# Patient Record
Sex: MALE | Race: WHITE | Employment: FULL TIME | URBAN - NONMETROPOLITAN AREA
[De-identification: names, ages, dates, MRNs, and addresses within clinical notes are randomized per-mention and may not be internally consistent; named-entity substitution may affect disease eponyms.]

---

## 2017-10-30 ENCOUNTER — HOSPITAL ENCOUNTER (OUTPATIENT)
Dept: NON INVASIVE DIAGNOSTICS | Age: 42
Discharge: HOME | End: 2017-10-30
Payer: COMMERCIAL

## 2017-10-30 VITALS — WEIGHT: 170 LBS | BODY MASS INDEX: 27.32 KG/M2 | HEIGHT: 66 IN

## 2017-10-30 PROCEDURE — 93017 CV STRESS TEST TRACING ONLY: CPT | Performed by: NUCLEAR MEDICINE

## 2017-10-30 RX ORDER — LOSARTAN POTASSIUM 50 MG/1
50 TABLET ORAL NIGHTLY
COMMUNITY
End: 2021-05-17

## 2020-01-19 ENCOUNTER — HOSPITAL ENCOUNTER (EMERGENCY)
Age: 45
Discharge: HOME | End: 2020-01-19
Payer: COMMERCIAL

## 2020-01-19 VITALS
HEIGHT: 66 IN | BODY MASS INDEX: 27.32 KG/M2 | WEIGHT: 170 LBS | HEART RATE: 74 BPM | OXYGEN SATURATION: 96 % | SYSTOLIC BLOOD PRESSURE: 138 MMHG | TEMPERATURE: 97 F | DIASTOLIC BLOOD PRESSURE: 93 MMHG | RESPIRATION RATE: 16 BRPM

## 2020-01-19 PROCEDURE — 99203 OFFICE O/P NEW LOW 30 MIN: CPT | Performed by: NURSE PRACTITIONER

## 2020-01-19 PROCEDURE — 6360000002 HC RX W HCPCS: Performed by: NURSE PRACTITIONER

## 2020-01-19 PROCEDURE — 99212 OFFICE O/P EST SF 10 MIN: CPT

## 2020-01-19 PROCEDURE — 90471 IMMUNIZATION ADMIN: CPT | Performed by: NURSE PRACTITIONER

## 2020-01-19 PROCEDURE — 90715 TDAP VACCINE 7 YRS/> IM: CPT | Performed by: NURSE PRACTITIONER

## 2020-01-19 RX ORDER — POLYMYXIN B SULFATE AND TRIMETHOPRIM 1; 10000 MG/ML; [USP'U]/ML
1 SOLUTION OPHTHALMIC EVERY 4 HOURS
Qty: 1 BOTTLE | Refills: 0 | Status: SHIPPED | OUTPATIENT
Start: 2020-01-19 | End: 2020-01-29

## 2020-01-19 RX ORDER — PROPARACAINE HYDROCHLORIDE 5 MG/ML
2 SOLUTION/ DROPS OPHTHALMIC ONCE
Status: DISCONTINUED | OUTPATIENT
Start: 2020-01-19 | End: 2020-01-19 | Stop reason: HOSPADM

## 2020-01-19 RX ADMIN — TETANUS TOXOID, REDUCED DIPHTHERIA TOXOID AND ACELLULAR PERTUSSIS VACCINE, ADSORBED 0.5 ML: 5; 2.5; 8; 8; 2.5 SUSPENSION INTRAMUSCULAR at 17:00

## 2020-01-19 ASSESSMENT — VISUAL ACUITY
OS: 20/20
OD: 20/30
OU: 20/20
OU: 1

## 2020-01-19 ASSESSMENT — ENCOUNTER SYMPTOMS
SHORTNESS OF BREATH: 0
EYE REDNESS: 1
NAUSEA: 0
SORE THROAT: 0
VOMITING: 0
EYE PAIN: 1

## 2020-01-19 ASSESSMENT — PAIN SCALES - GENERAL: PAINLEVEL_OUTOF10: 3

## 2020-01-19 ASSESSMENT — PAIN DESCRIPTION - LOCATION: LOCATION: EYE

## 2020-01-19 NOTE — ED PROVIDER NOTES
[facility] 38  Urgent Care Encounter       CHIEF COMPLAINT       Chief Complaint   Patient presents with    Foreign Body in Eye     left. putting drop ceilings in and was cutting metal and thinks a piece got in eye       Nurses Notes reviewed and I agree except as noted in the HPI. HISTORY OF PRESENT ILLNESS   Kareem Taylor is a 45 y.o. male who presents evaluation of left eye irritation and the feeling of a foreign body. Patient states that roughly 2 hours before arrival at the urgent care he was cutting metal pieces while hanging ceiling and believes that a piece of metal got into his eye. States that has been very irritated. He denies any visual changes or significant pain/flashing lights behind the eye. States that his tetanus is not up-to-date. The history is provided by the patient. REVIEW OF SYSTEMS     Review of Systems   Constitutional: Negative for chills and fever. HENT: Negative for congestion and sore throat. Eyes: Positive for pain and redness. Negative for visual disturbance. Respiratory: Negative for shortness of breath. Cardiovascular: Negative for chest pain. Gastrointestinal: Negative for nausea and vomiting. Musculoskeletal: Negative for arthralgias and myalgias. Skin: Negative for rash. Allergic/Immunologic: Negative for immunocompromised state. Neurological: Negative for headaches. PAST MEDICAL HISTORY         Diagnosis Date    GERD (gastroesophageal reflux disease)     hiatal hernia       SURGICALHISTORY     Patient  has a past surgical history that includes back surgery.     CURRENT MEDICATIONS       Previous Medications    ACETAMINOPHEN (TYLENOL) 500 MG TABLET    Take 500 mg by mouth every 6 hours as needed for Pain    ESOMEPRAZOLE (NEXIUM) 40 MG CAPSULE      Take 40 mg by mouth 2 times daily     IBUPROFEN (ADVIL;MOTRIN) 600 MG TABLET    Take 1 tablet by mouth 3 times daily as needed for Pain or Fever (Take with food 3 times daily for pain or fever)    LOSARTAN (COZAAR) 50 MG TABLET    Take 50 mg by mouth nightly       ALLERGIES     Patient is has No Known Allergies. Patients   Immunization History   Administered Date(s) Administered    Tdap (Boostrix, Adacel) 01/19/2020       FAMILY HISTORY     Patient's family history includes Diabetes in his father; Heart Disease in his father; Heart Surgery in his father; High Blood Pressure in his father. SOCIAL HISTORY     Patient  reports that he has never smoked. He has never used smokeless tobacco. He reports that he does not drink alcohol or use drugs. PHYSICAL EXAM     ED TRIAGE VITALS  BP: (!) 138/93, Temp: 97 °F (36.1 °C), Pulse: 74, Resp: 16, SpO2: 96 %,Estimated body mass index is 27.44 kg/m² as calculated from the following:    Height as of this encounter: 5' 6\" (1.676 m). Weight as of this encounter: 170 lb (77.1 kg). ,No LMP for male patient. Physical Exam  Constitutional:       General: He is not in acute distress. Appearance: He is well-developed. He is not diaphoretic. Eyes:      General: Lids are normal. Vision grossly intact. Left eye: No foreign body. Conjunctiva/sclera:      Right eye: Right conjunctiva is not injected. Left eye: Left conjunctiva is injected. Pupils: Pupils are equal, round, and reactive to light. Pupils are equal.      Left eye: Corneal abrasion present. Comments: Small abrasion noted to the left cornea   Neck:      Musculoskeletal: Normal range of motion. Cardiovascular:      Rate and Rhythm: Normal rate and regular rhythm. Heart sounds: No murmur. Pulmonary:      Effort: Pulmonary effort is normal. No respiratory distress. Breath sounds: Normal breath sounds. Musculoskeletal:      Right knee: He exhibits normal range of motion. Left knee: He exhibits normal range of motion. Skin:     General: Skin is warm. Findings: No rash.    Neurological:      Mental Status: He is alert

## 2020-01-19 NOTE — ED NOTES
To [facility] with complaints of foreign body in left eye. States that is started a few hours ago when he was putting in a drop ceiling.  States that he was cutting metal and thinks a piece may of gotten into his left eye     Júnior Villalta RN  01/19/20 1640

## 2020-05-24 LAB
BASOPHILS ABSOLUTE: 0.1 /ΜL
BASOPHILS RELATIVE PERCENT: 1.4 %
EOSINOPHILS ABSOLUTE: 0.4 /ΜL
EOSINOPHILS RELATIVE PERCENT: 5.3 %
FERRITIN: 66 NG/ML (ref 18–300)
GLUCOSE BLD-MCNC: 103 MG/DL
HCT VFR BLD CALC: 42.4 % (ref 41–53)
HEMOGLOBIN: 14.2 G/DL (ref 13.5–17.5)
LYMPHOCYTES ABSOLUTE: 1.7 /ΜL
LYMPHOCYTES RELATIVE PERCENT: 22.5 %
MCH RBC QN AUTO: 27.4 PG
MCHC RBC AUTO-ENTMCNC: 33.6 G/DL
MCV RBC AUTO: 82 FL
MONOCYTES ABSOLUTE: 0.4 /ΜL
MONOCYTES RELATIVE PERCENT: 5.4 %
NEUTROPHILS ABSOLUTE: 4.9 /ΜL
NEUTROPHILS RELATIVE PERCENT: 65.4 %
PDW BLD-RTO: 14.1 %
PLATELET # BLD: 270 K/ΜL
PMV BLD AUTO: 8.3 FL
PROSTATE SPECIFIC ANTIGEN: 0.44 NG/ML
RBC # BLD: 5.2 10^6/ΜL
WBC # BLD: 7.5 10^3/ML

## 2021-02-02 ENCOUNTER — OFFICE VISIT (OUTPATIENT)
Dept: UROLOGY | Age: 46
End: 2021-02-02
Payer: COMMERCIAL

## 2021-02-02 VITALS — WEIGHT: 175 LBS | HEIGHT: 66 IN | TEMPERATURE: 96.6 F | BODY MASS INDEX: 28.12 KG/M2

## 2021-02-02 DIAGNOSIS — N40.1 LOWER URINARY TRACT SYMPTOMS DUE TO BENIGN PROSTATIC HYPERPLASIA: Primary | ICD-10-CM

## 2021-02-02 LAB
BILIRUBIN URINE: NEGATIVE
BLOOD URINE, POC: NEGATIVE
CHARACTER, URINE: CLEAR
COLOR, URINE: YELLOW
GLUCOSE URINE: NEGATIVE MG/DL
KETONES, URINE: NEGATIVE
LEUKOCYTE CLUMPS, URINE: NEGATIVE
NITRITE, URINE: NEGATIVE
PH, URINE: 6.5 (ref 5–9)
POST VOID RESIDUAL (PVR): 17 ML
PROTEIN, URINE: NEGATIVE MG/DL
SPECIFIC GRAVITY, URINE: 1.02 (ref 1–1.03)
UROBILINOGEN, URINE: 0.2 EU/DL (ref 0–1)

## 2021-02-02 PROCEDURE — 99203 OFFICE O/P NEW LOW 30 MIN: CPT | Performed by: UROLOGY

## 2021-02-02 PROCEDURE — 81003 URINALYSIS AUTO W/O SCOPE: CPT | Performed by: UROLOGY

## 2021-02-02 PROCEDURE — 51798 US URINE CAPACITY MEASURE: CPT | Performed by: UROLOGY

## 2021-02-02 RX ORDER — ALFUZOSIN HYDROCHLORIDE 10 MG/1
10 TABLET, EXTENDED RELEASE ORAL NIGHTLY
Qty: 30 TABLET | Refills: 5 | Status: SHIPPED | OUTPATIENT
Start: 2021-02-02 | End: 2021-05-21

## 2021-02-02 ASSESSMENT — ENCOUNTER SYMPTOMS
EYE PAIN: 0
SHORTNESS OF BREATH: 0
BACK PAIN: 0
COLOR CHANGE: 0
CHEST TIGHTNESS: 0
NAUSEA: 0
EYE ITCHING: 0
DIARRHEA: 0

## 2021-02-02 NOTE — PROGRESS NOTES
Attends [employer] service: Not on file     Active member of club or organization: Not on file     Attends meetings of clubs or organizations: Not on file     Relationship status: Not on file    Intimate partner violence     Fear of current or ex partner: Not on file     Emotionally abused: Not on file     Physically abused: Not on file     Forced sexual activity: Not on file   Other Topics Concern    Not on file   Social History Narrative    Not on file       Family History   Problem Relation Age of Onset    Heart Disease Father     Heart Surgery Father     Diabetes Father     High Blood Pressure Father        Past Surgical History:   Procedure Laterality Date    BACK SURGERY         No Known Allergies      Current Outpatient Medications:     alfuzosin (UROXATRAL) 10 MG extended release tablet, Take 1 tablet by mouth nightly, Disp: 30 tablet, Rfl: 5    acetaminophen (TYLENOL) 500 MG tablet, Take 500 mg by mouth every 6 hours as needed for Pain, Disp: , Rfl:     esomeprazole (NEXIUM) 40 MG capsule,  Take 40 mg by mouth 2 times daily , Disp: , Rfl:     losartan (COZAAR) 50 MG tablet, Take 50 mg by mouth nightly, Disp: , Rfl:     Review of Systems   Constitutional: Negative for chills and fever. Eyes: Negative for pain and itching. Respiratory: Negative for chest tightness and shortness of breath. Cardiovascular: Negative for chest pain and leg swelling. Gastrointestinal: Negative for diarrhea and nausea. Endocrine: Negative for cold intolerance and heat intolerance. Genitourinary: Positive for difficulty urinating (weak stream), frequency and urgency. Negative for hematuria. Musculoskeletal: Negative for back pain and joint swelling. Skin: Negative for color change and rash. Allergic/Immunologic: Negative for environmental allergies and food allergies. Neurological: Negative for dizziness and headaches. Hematological: Does not bruise/bleed easily. Temp 96.6 °F (35.9 °C)   Ht 5' 6\" (1.676 m)   Wt 175 lb (79.4 kg)   BMI 28.25 kg/m²     Objective:   Physical Exam  Vitals signs reviewed. Constitutional:       Appearance: He is well-developed and normal weight. HENT:      Head: Normocephalic and atraumatic. Right Ear: External ear normal.      Left Ear: External ear normal.   Eyes:      Extraocular Movements: Extraocular movements intact. Conjunctiva/sclera: Conjunctivae normal.      Pupils: Pupils are equal, round, and reactive to light. Abdominal:      General: Bowel sounds are normal. There is no distension. Palpations: Abdomen is soft. There is no mass. Tenderness: There is no abdominal tenderness. There is no right CVA tenderness, left CVA tenderness or guarding. Hernia: No hernia is present. Genitourinary:     Penis: Normal.       Testes: Normal.      Prostate: Normal.      Rectum: Normal.      Comments: No rectal mass; the prostate is smooth, symmetric, no palpable nodules  Skin:     General: Skin is warm and dry. Neurological:      Mental Status: He is alert and oriented to person, place, and time. Psychiatric:         Mood and Affect: Mood normal.         Behavior: Behavior normal.         Assessment:       Diagnosis Orders   1. Lower urinary tract symptoms due to benign prostatic hyperplasia  POCT Urinalysis No Micro (Auto)    poct post void residual       Mr. Zapatapresents today in consultation for Lower urinary tract symptoms due to benign prostatic hyperplasia [N40.1]. The patient has early obstructive voiding symptoms. I told him 40% of men will have this at his age. I also told him that 20% of alpha blockers are ineffective had relieving voiding symptoms. I also told him that a 5 alpha reductase inhibitor is unlikely to work in someone his age and even if it did it would take 6 to 12 months to be effective. There is a family history of prostate cancer.   His exam and PSA completely normal. Plan:        1. Discontinue finasteride. 2.  I am starting him on Uroxatrol 10 mg p.o. nightly. I discussed retrograde ejaculation in detail as well as lightheadedness and other common side effects.   3.  Return in 4 weeks

## 2021-02-27 PROBLEM — L72.3 SEBACEOUS CYST OF LEFT AXILLA: Status: ACTIVE | Noted: 2021-02-27

## 2021-02-27 NOTE — PROGRESS NOTES
Shawnee Lee MD [facility]  General Surgery  New Patient Evaluation in Office  Pt Name: Adam Boone  Date of Birth 1975   Today's Date: 3/1/2021  Medical Record Number: [id number]  Referring Provider: Naun Saha MD  Primary Care Provider: Jasper Knight MD  Chief Complaint   Patient presents with   [employer] Surgical Consult     new patient--ref by Dr. Jose Keenan for right cyst left axilla     ASSESSMENT       Diagnosis Orders   1. Infected sebaceous cyst of skin     2. Abscess of right axilla  46441 - [place] DRAIN SKIN ABSCESS SIMPLE     Past Medical History:   Diagnosis Date    GERD (gastroesophageal reflux disease)     hiatal hernia    High blood pressure     Urinary retention     Dr. Sohan Lowe      1. Schedule Danie for excision of I and D right axillary abscess  2. The risks complications and benefits of the procedure were discussed with the patient including, but not limited to, infection, bleeding, recurrence. The anticipated consequences of not treating this problem were also discussed. The patient was given an opportunity to ask questions. Once answered, the patient is agreeable to proceed with the procedure. 2. Status: office  3. Planned anesthesia: local  4. Donna Finch is a 39 y.o. male seen in the consultation for evaluation of a subcutaneous mass. The mass in his RIGHT axilla posterior fold. He saw Dr. Jose Keenan in the office on February 19 describing the right arm axilla area painful cyst.  According to his documentation he complaining of an occasional tender mass in the axilla for a couple of months fever chills no drainage exam describes a nonfluctuant fluid-filled mass in the axilla with kind of dusky erythema over the surface. He is referred for evaluation of this. He was started on Duricef 500 mg 1 p.o. twice daily x7 days with 1 refill. He says it hasnt made any difference in he redness or pain. He has had no constitutional sxs. Never had this before.    Minnie notes reviewed  Past Medical History  Past Medical History:   Diagnosis Date    GERD (gastroesophageal reflux disease)     hiatal hernia    High blood pressure     Urinary retention     Dr. Jad Huff     Past Surgical History  Past Surgical History:   Procedure Laterality Date    BACK SURGERY      COLONOSCOPY  02/2021    Dr. Chris Shukla  2017    Dr. Nicole Shukla-- Hiatal hernia     Medications  Current Outpatient Medications   Medication Sig Dispense Refill    alfuzosin (UROXATRAL) 10 MG extended release tablet Take 1 tablet by mouth nightly 30 tablet 5    losartan (COZAAR) 50 MG tablet Take 50 mg by mouth nightly      esomeprazole (NEXIUM) 40 MG capsule   Take 40 mg by mouth 2 times daily       acetaminophen (TYLENOL) 500 MG tablet Take 500 mg by mouth every 6 hours as needed for Pain       No current facility-administered medications for this visit. Allergies  has No Known Allergies. Family History  family history includes Diabetes in his father; Heart Disease in his father; Heart Surgery in his father; High Blood Pressure in his father. Social History   reports that he has never smoked. He has never used smokeless tobacco. He reports that he does not drink alcohol or use drugs. Health Screening Exams  Health Maintenance   Topic Date Due    Hepatitis C screen  1975    HIV screen  05/26/1992    Potassium monitoring  03/26/2017    Creatinine monitoring  03/26/2017    Diabetes screen  05/26/2017    Flu vaccine (1) 09/01/2020    Lipid screen  03/26/2021    DTaP/Tdap/Td vaccine (2 - Td) 01/19/2030    Hepatitis A vaccine  Aged Out    Hepatitis B vaccine  Aged Out    Hib vaccine  Aged Out    Meningococcal (ACWY) vaccine  Aged Out    Pneumococcal 0-64 years Vaccine  Aged Out     Review of Systems  Constitutional: Negative for activity change, appetite change, chills, diaphoresis, fatigue, fever and unexpected weight change.    HENT: Negative for congestion, pressure is 125/78 and his pulse is 71. His respiration is 15 and oxygen saturation is 98%. CONSTITUTIONAL: Alert and oriented times 3, no acute distress and cooperative to examination with proper mood and affect. SKIN: Skin color, texture, turgor normal. In the right axilla, posterior fold is a 4 cm raised, red, swollen, fluctuant tender mass c/w abscess. It is skin associated. LYMPH: No axillary  lymphadenopathy. Thank you for the interesting evaluation. Further recommendations as listed above. Electronically signed by Edson Arango MD on 3/1/2021 at 7:04 PM   [facility]  Operative Report    PATIENT NAME: Mikki Gaytan  RECORD NO. [id number]  SURGEON: Edson Arango MD, FACS  Primary Care Physician: Diana Sutton MD  Date: 3/1/2021, 7:04 PM     PROCEDURE PERFORMED: Incision and Drainage  PREOPERATIVE DIAGNOSIS:  axillary abscess   POSTOPERATIVE DIAGNOSIS: Same  SURGEON:  Edson Arango MD, FACS  ANESTHESIA:  Local anesthesia 1% buffered lidocaine, 0.5% bupivacaine and local  ANESTHESIA: 0.5 % Marcaine in equal parts  5 mls used                           ESTIMATED BLOOD LOSS:  0  ml  SPECIMEN:  none  COMPLICATIONS:  None; patient tolerated the procedure well. DISPOSITION: home  CONDITION: stable      Procedure: The patient was positioned appropriately and the skin over the incision site was prepped with Chloraprep. Local anesthesia was obtained by infiltration using 1% Lidocaine without epinephrine. An incision was then made over the greatest area of fluctuance and approximately 5 cc of serous material was expressed. Loculations were not present. The drainage cavity was then dressed with a sterile dressing. The patient tolerated the procedure well.     Complications: None    Edson Arango MD FACS  Electronically signed 3/1/2021 at 7:04 PM

## 2021-03-01 ENCOUNTER — OFFICE VISIT (OUTPATIENT)
Dept: SURGERY | Age: 46
End: 2021-03-01
Payer: COMMERCIAL

## 2021-03-01 VITALS
SYSTOLIC BLOOD PRESSURE: 125 MMHG | BODY MASS INDEX: 27.97 KG/M2 | HEIGHT: 66 IN | HEART RATE: 71 BPM | TEMPERATURE: 97.1 F | OXYGEN SATURATION: 98 % | DIASTOLIC BLOOD PRESSURE: 78 MMHG | WEIGHT: 174 LBS | RESPIRATION RATE: 15 BRPM

## 2021-03-01 DIAGNOSIS — L08.9 INFECTED SEBACEOUS CYST OF SKIN: ICD-10-CM

## 2021-03-01 DIAGNOSIS — L72.3 INFECTED SEBACEOUS CYST OF SKIN: ICD-10-CM

## 2021-03-01 DIAGNOSIS — L02.411 ABSCESS OF RIGHT AXILLA: ICD-10-CM

## 2021-03-01 PROCEDURE — 99202 OFFICE O/P NEW SF 15 MIN: CPT | Performed by: SURGERY

## 2021-03-01 PROCEDURE — 10060 I&D ABSCESS SIMPLE/SINGLE: CPT | Performed by: SURGERY

## 2021-03-01 ASSESSMENT — ENCOUNTER SYMPTOMS
APNEA: 0
BLOOD IN STOOL: 0
BACK PAIN: 0
CHOKING: 0
SHORTNESS OF BREATH: 0
EYE ITCHING: 0
COLOR CHANGE: 0
EYE REDNESS: 0
VOMITING: 0
SINUS PAIN: 0
VOICE CHANGE: 0
FACIAL SWELLING: 0
WHEEZING: 0
ABDOMINAL PAIN: 0
STRIDOR: 0
CONSTIPATION: 0
SINUS PRESSURE: 0
EYE PAIN: 0
TROUBLE SWALLOWING: 0
RHINORRHEA: 0
EYE DISCHARGE: 0
PHOTOPHOBIA: 0
ANAL BLEEDING: 0
COUGH: 0
NAUSEA: 0
ABDOMINAL DISTENTION: 0
DIARRHEA: 0
SORE THROAT: 0
CHEST TIGHTNESS: 0
RECTAL PAIN: 0

## 2021-03-01 NOTE — LETTER
[facility]  Linda Osullivan MD FACS  Phone- [phone]  Fax [phone]    Pt Name: Ayana Evans Record Number: [id number]  Date of Birth 1975   Today's Date: 3/1/2021    Junie Montes was evaluated in the office today. My assessment and plans are listed below. Assessment:     Danie was seen today for surgical consult. Diagnoses and all orders for this visit:    Infected sebaceous cyst of skin    Abscess of right axilla  -     97871 - [place] DRAIN SKIN ABSCESS SIMPLE         Plan:  1. Schedule Danie for excision of I and D right axillary abscess  2. The risks complications and benefits of the procedure were discussed with the patient including, but not limited to, infection, bleeding, recurrence. The anticipated consequences of not treating this problem were also discussed. The patient was given an opportunity to ask questions. Once answered, the patient is agreeable to proceed with the procedure. 2. Status: office  3. Planned anesthesia: local  4. If I can provide any additional assistance or you have any concerns, please feel free to contact me. Thank you for allowing to participate in the care of your patients. Sincerely,      Linda Osullivan MD FACS  [address]  [place] [address]  Office: [phone]  Fax: [phone]

## 2021-03-01 NOTE — PROGRESS NOTES
Subjective:      Patient ID: Robyn Light is a 39 y.o. male. Chief Complaint   Patient presents with    Surgical Consult     new patient--ref by Dr. Gudelia Madrigal for right cyst left axilla       HPI  /78 (Site: Right Upper Arm, Position: Sitting, Cuff Size: Medium Adult)   Pulse 71   Temp 97.1 °F (36.2 °C) (Tympanic)   Resp 15   Ht 5' 6\" (1.676 m)   Wt 174 lb (78.9 kg)   SpO2 98%   BMI 28.08 kg/m²     Review of Systems   Constitutional: Negative for activity change, appetite change, chills, diaphoresis, fatigue, fever and unexpected weight change. HENT: Negative for congestion, dental problem, drooling, ear discharge, ear pain, facial swelling, hearing loss, mouth sores, nosebleeds, postnasal drip, rhinorrhea, sinus pressure, sinus pain, sneezing, sore throat, tinnitus, trouble swallowing and voice change. Eyes: Negative for photophobia, pain, discharge, redness, itching and visual disturbance. Respiratory: Negative for apnea, cough, choking, chest tightness, shortness of breath, wheezing and stridor. Cardiovascular: Negative for chest pain, palpitations and leg swelling. Gastrointestinal: Negative for abdominal distention, abdominal pain, anal bleeding, blood in stool, constipation, diarrhea, nausea, rectal pain and vomiting. Endocrine: Negative for cold intolerance, heat intolerance, polydipsia, polyphagia and polyuria. Genitourinary: Negative for decreased urine volume, difficulty urinating, discharge, dysuria, enuresis, flank pain, frequency, genital sores, hematuria, penile pain, penile swelling, scrotal swelling, testicular pain and urgency. Musculoskeletal: Negative for arthralgias, back pain, gait problem, joint swelling, myalgias, neck pain and neck stiffness. Skin: Positive for wound (right axilla cyst). Negative for color change, pallor and rash. Allergic/Immunologic: Negative for environmental allergies, food allergies and immunocompromised state. Neurological: Negative for dizziness, tremors, seizures, syncope, facial asymmetry, speech difficulty, weakness, light-headedness, numbness and headaches. Hematological: Negative for adenopathy. Does not bruise/bleed easily. Psychiatric/Behavioral: Negative for agitation, behavioral problems, confusion, decreased concentration, dysphoric mood, hallucinations, self-injury, sleep disturbance and suicidal ideas. The patient is not nervous/anxious and is not hyperactive.         Objective:   Physical Exam    Assessment:            Plan:              Braulio Andrade

## 2021-03-02 ENCOUNTER — OFFICE VISIT (OUTPATIENT)
Dept: UROLOGY | Age: 46
End: 2021-03-02
Payer: COMMERCIAL

## 2021-03-02 VITALS — TEMPERATURE: 97.3 F | HEIGHT: 66 IN | WEIGHT: 174 LBS | BODY MASS INDEX: 27.97 KG/M2

## 2021-03-02 DIAGNOSIS — N40.1 LOWER URINARY TRACT SYMPTOMS DUE TO BENIGN PROSTATIC HYPERPLASIA: Primary | ICD-10-CM

## 2021-03-02 LAB
BILIRUBIN URINE: NEGATIVE
BLOOD URINE, POC: NEGATIVE
CHARACTER, URINE: CLEAR
COLOR, URINE: YELLOW
GLUCOSE URINE: NEGATIVE MG/DL
KETONES, URINE: NEGATIVE
LEUKOCYTE CLUMPS, URINE: NEGATIVE
NITRITE, URINE: NEGATIVE
PH, URINE: 6 (ref 5–9)
PROTEIN, URINE: NEGATIVE MG/DL
SPECIFIC GRAVITY, URINE: 1.01 (ref 1–1.03)
UROBILINOGEN, URINE: 0.2 EU/DL (ref 0–1)

## 2021-03-02 PROCEDURE — 81003 URINALYSIS AUTO W/O SCOPE: CPT | Performed by: UROLOGY

## 2021-03-02 PROCEDURE — 99212 OFFICE O/P EST SF 10 MIN: CPT | Performed by: UROLOGY

## 2021-04-05 ENCOUNTER — OFFICE VISIT (OUTPATIENT)
Dept: SURGERY | Age: 46
End: 2021-04-05
Payer: COMMERCIAL

## 2021-04-05 VITALS
SYSTOLIC BLOOD PRESSURE: 130 MMHG | RESPIRATION RATE: 18 BRPM | DIASTOLIC BLOOD PRESSURE: 62 MMHG | BODY MASS INDEX: 27.97 KG/M2 | OXYGEN SATURATION: 98 % | HEIGHT: 66 IN | WEIGHT: 174 LBS | HEART RATE: 91 BPM | TEMPERATURE: 96.8 F

## 2021-04-05 DIAGNOSIS — L02.411 ABSCESS OF RIGHT AXILLA: Primary | ICD-10-CM

## 2021-04-05 PROCEDURE — G8419 CALC BMI OUT NRM PARAM NOF/U: HCPCS | Performed by: SURGERY

## 2021-04-05 PROCEDURE — 99211 OFF/OP EST MAY X REQ PHY/QHP: CPT | Performed by: SURGERY

## 2021-04-05 PROCEDURE — G8427 DOCREV CUR MEDS BY ELIG CLIN: HCPCS | Performed by: SURGERY

## 2021-04-05 ASSESSMENT — ENCOUNTER SYMPTOMS
FACIAL SWELLING: 0
COLOR CHANGE: 0
EYE DISCHARGE: 0
EYE REDNESS: 0
RECTAL PAIN: 0
SORE THROAT: 0
SINUS PRESSURE: 0
WHEEZING: 0
COUGH: 0
APNEA: 0
EYE ITCHING: 0
SHORTNESS OF BREATH: 0
ABDOMINAL DISTENTION: 0
RHINORRHEA: 0
EYE PAIN: 0
STRIDOR: 0
NAUSEA: 0
TROUBLE SWALLOWING: 0
CHOKING: 0
VOICE CHANGE: 0
ABDOMINAL PAIN: 0
ANAL BLEEDING: 0
DIARRHEA: 0
PHOTOPHOBIA: 0
BACK PAIN: 0
CHEST TIGHTNESS: 0
BLOOD IN STOOL: 0
VOMITING: 0
CONSTIPATION: 0

## 2021-04-05 NOTE — PROGRESS NOTES
Je Martínez MD [facility]  General Surgery  New Patient Evaluation in Office  Pt Name: Sanju Marsh  Date of Birth 1975   Today's Date: 4/5/2021  Medical Record Number: [id number]  Referring Provider: No ref. provider found  Primary Care Provider: Ernesto Velasco MD  Chief Complaint   Patient presents with    Follow Up After Procedure     s/p I and D right axillary abscess in office 3/1/21     ASSESSMENT       Diagnosis Orders   1. Abscess of right axilla       Past Medical History:   Diagnosis Date    GERD (gastroesophageal reflux disease)     hiatal hernia    High blood pressure     Urinary retention     Dr. Paulina Read      1. Schedule Danie for nothing at this time as clinically he is completely resolved and there appears to be no residual cyst in this area  2. He was instructed that if he developed any further problems in his area did disco ahead and call the office we be glad to see him without a referral     Juan Manuel Pires is a 39 y.o. male seen initially in consultation for evaluation of a subcutaneous mass. The mass in his RIGHT axilla posterior fold. He saw Dr. Eddy Woodard in the office on February 19 describing the right arm axilla area painful cyst.  According to his documentation he complaining of an occasional tender mass in the axilla for a couple of months fever chills no drainage exam describes a nonfluctuant fluid-filled mass in the axilla with kind of dusky erythema over the surface. He is referred for evaluation of this. He was started on Duricef 500 mg 1 p.o. twice daily x7 days with 1 refill. He says it hasnt made any difference in he redness or pain. He has had no constitutional sxs. Never had this before. Dr Ronit Trimble notes reviewed  In the office at his last visit we did an I&D which revealed a cystic cavity and this was left open to heal by secondary intention. He is now back in the office for reevaluation.   He states is completely closed up and he cannot feel anything there and has had no drainage at least for a couple of weeks. Past Medical History  Past Medical History:   Diagnosis Date    GERD (gastroesophageal reflux disease)     hiatal hernia    High blood pressure     Urinary retention     Dr. Claude Sellers     Past Surgical History  Past Surgical History:   Procedure Laterality Date    BACK SURGERY      COLONOSCOPY  02/2021    Dr. Cherise Ganser HISTORY  03/01/2021    s/p I and D right axillary abscess in office Dr He Smalls  2017    Dr. Rosemary Cameron-- Hiatal hernia     Medications  Current Outpatient Medications   Medication Sig Dispense Refill    alfuzosin (UROXATRAL) 10 MG extended release tablet Take 1 tablet by mouth nightly 30 tablet 5    losartan (COZAAR) 50 MG tablet Take 50 mg by mouth nightly      acetaminophen (TYLENOL) 500 MG tablet Take 500 mg by mouth every 6 hours as needed for Pain      esomeprazole (NEXIUM) 40 MG capsule   Take 40 mg by mouth 2 times daily        No current facility-administered medications for this visit. Allergies  has No Known Allergies. Family History  family history includes Diabetes in his father; Heart Disease in his father; Heart Surgery in his father; High Blood Pressure in his father. Social History   reports that he has never smoked. He has never used smokeless tobacco. He reports that he does not drink alcohol or use drugs.   Health Screening Exams  Health Maintenance   Topic Date Due    Hepatitis C screen  Never done    HIV screen  Never done    COVID-19 Vaccine (1) Never done    Potassium monitoring  03/26/2017    Creatinine monitoring  03/26/2017    Diabetes screen  Never done    Lipid screen  03/26/2021    Flu vaccine (Season Ended) 09/01/2021    DTaP/Tdap/Td vaccine (2 - Td) 01/19/2030    Hepatitis A vaccine  Aged Out    Hepatitis B vaccine  Aged Out    Hib vaccine  Aged Out    Meningococcal (ACWY) vaccine  Aged Out    Pneumococcal 0-64 years Vaccine  Aged Out     Review of Systems  Constitutional: Negative for activity change, appetite change, chills, diaphoresis, fatigue, fever and unexpected weight change. HENT: Negative for congestion, dental problem, drooling, ear discharge, ear pain, facial swelling, hearing loss, mouth sores, nosebleeds, postnasal drip, rhinorrhea, sinus pressure, sinus pain, sneezing, sore throat, tinnitus, trouble swallowing and voice change. Eyes: Negative for photophobia, pain, discharge, redness, itching and visual disturbance. Respiratory: Negative for apnea, cough, choking, chest tightness, shortness of breath, wheezing and stridor. Cardiovascular: Negative for chest pain, palpitations and leg swelling. Gastrointestinal: Negative for abdominal distention, abdominal pain, anal bleeding, blood in stool, constipation, diarrhea, nausea, rectal pain and vomiting. Endocrine: Negative for cold intolerance, heat intolerance, polydipsia, polyphagia and polyuria. Genitourinary: Negative for decreased urine volume, difficulty urinating, discharge, dysuria, enuresis, flank pain, frequency, genital sores, hematuria, penile pain, penile swelling, scrotal swelling, testicular pain and urgency. Musculoskeletal: Negative for arthralgias, back pain, gait problem, joint swelling, myalgias, neck pain and neck stiffness. Skin: Positive for wound (right axilla cyst). Negative for color change, pallor and rash. Allergic/Immunologic: Negative for environmental allergies, food allergies and immunocompromised state. Neurological: Negative for dizziness, tremors, seizures, syncope, facial asymmetry, speech difficulty, weakness, light-headedness, numbness and headaches. Hematological: Negative for adenopathy. Does not bruise/bleed easily.    Psychiatric/Behavioral: Negative for agitation, behavioral problems, confusion, decreased concentration, dysphoric mood, hallucinations, self-injury, sleep disturbance and suicidal ideas. The patient is not nervous/anxious and is not hyperactive      OBJECTIVE    VITALS:  height is 5' 6\" (1.676 m) and weight is 174 lb (78.9 kg). His temporal temperature is 96.8 °F (36 °C). His blood pressure is 130/62 and his pulse is 91. His respiration is 18 and oxygen saturation is 98%. CONSTITUTIONAL: Alert and oriented times 3, no acute distress and cooperative to examination with proper mood and affect. SKIN: Skin color, texture, turgor normal. In the right axilla, posterior fold i there is a scar from the I&D is completely closed somewhat violaceous red but there is no swelling induration edema or palpable nodule of any kind    LYMPH: No axillary  lymphadenopathy. Thank you for the interesting evaluation. Further recommendations as listed above.        Electronically signed by Valery Rizzo MD on 4/6/2021 at 7:32 AM

## 2021-04-05 NOTE — LETTER
[facility]  Suzi Pineda MD FACS  Phone- [phone]  Fax [phone]    Pt Name: Ayana Evans Record Number: [id number]  Date of Birth 1975   Today's Date: 4/6/2021    Hossein Mckeon was evaluated in the office today. My assessment and plans are listed below. Assessment:     Danie was seen today for follow up after procedure. Diagnoses and all orders for this visit:    Abscess of right axilla         Plan:  1. Schedule Danie for nothing at this time as clinically he is completely resolved and there appears to be no residual cyst in this area  2. He was instructed that if he developed any further problems in his area did disco ahead and call the office we be glad to see him without a referral             If I can provide any additional assistance or you have any concerns, please feel free to contact me. Thank you for allowing to participate in the care of your patients. Sincerely,      Suzi Pineda MD FACS  [address]  [place], [address]  Office: [phone]  Fax: [phone]

## 2021-04-05 NOTE — PROGRESS NOTES
Subjective:      Patient ID: Ray Pascual is a 39 y.o. male. Chief Complaint   Patient presents with    Follow Up After Procedure     s/p I and D right axillary abscess in office 3/1/21       HPI    Review of Systems   Constitutional: Negative for activity change, appetite change, chills, diaphoresis, fatigue, fever and unexpected weight change. HENT: Negative for congestion, dental problem, drooling, ear discharge, ear pain, facial swelling, hearing loss, mouth sores, nosebleeds, postnasal drip, rhinorrhea, sinus pressure, sneezing, sore throat, tinnitus, trouble swallowing and voice change. Eyes: Negative for photophobia, pain, discharge, redness, itching and visual disturbance. Respiratory: Negative for apnea, cough, choking, chest tightness, shortness of breath, wheezing and stridor. Cardiovascular: Negative for chest pain, palpitations and leg swelling. Gastrointestinal: Negative for abdominal distention, abdominal pain, anal bleeding, blood in stool, constipation, diarrhea, nausea, rectal pain and vomiting. Endocrine: Negative for cold intolerance, heat intolerance, polydipsia, polyphagia and polyuria. Genitourinary: Negative for decreased urine volume, difficulty urinating, dysuria, enuresis, flank pain, frequency, genital sores, hematuria and urgency. Musculoskeletal: Negative for arthralgias, back pain, gait problem, joint swelling, myalgias, neck pain and neck stiffness. Skin: Negative for color change, pallor, rash and wound. Right axilla cyst   Allergic/Immunologic: Negative for environmental allergies, food allergies and immunocompromised state. Neurological: Negative for dizziness, tremors, seizures, syncope, facial asymmetry, speech difficulty, weakness, light-headedness, numbness and headaches. Hematological: Negative for adenopathy. Does not bruise/bleed easily.    Psychiatric/Behavioral: Negative for agitation, behavioral problems, confusion, decreased concentration, dysphoric mood, hallucinations, self-injury, sleep disturbance and suicidal ideas. The patient is not nervous/anxious and is not hyperactive.       /62 (Site: Right Upper Arm, Position: Sitting, Cuff Size: Medium Adult)   Pulse 91   Temp 96.8 °F (36 °C) (Temporal)   Resp 18   Ht 5' 6\" (1.676 m)   Wt 174 lb (78.9 kg)   SpO2 98%   BMI 28.08 kg/m²     Objective:   Physical Exam    Assessment:            Plan:              Nahid Howell LPN

## 2021-09-03 ENCOUNTER — OFFICE VISIT (OUTPATIENT)
Dept: UROLOGY | Age: 46
End: 2021-09-03
Payer: COMMERCIAL

## 2021-09-03 VITALS — HEIGHT: 66 IN | BODY MASS INDEX: 26.52 KG/M2 | RESPIRATION RATE: 17 BRPM | WEIGHT: 165 LBS

## 2021-09-03 DIAGNOSIS — N40.1 BENIGN LOCALIZED PROSTATIC HYPERPLASIA WITH LOWER URINARY TRACT SYMPTOMS (LUTS): Primary | ICD-10-CM

## 2021-09-03 PROCEDURE — 99214 OFFICE O/P EST MOD 30 MIN: CPT | Performed by: UROLOGY

## 2021-09-03 PROCEDURE — G8419 CALC BMI OUT NRM PARAM NOF/U: HCPCS | Performed by: UROLOGY

## 2021-09-03 PROCEDURE — 1036F TOBACCO NON-USER: CPT | Performed by: UROLOGY

## 2021-09-03 PROCEDURE — G8427 DOCREV CUR MEDS BY ELIG CLIN: HCPCS | Performed by: UROLOGY

## 2021-09-03 RX ORDER — ALFUZOSIN HYDROCHLORIDE 10 MG/1
10 TABLET, EXTENDED RELEASE ORAL DAILY
COMMUNITY
End: 2022-05-27 | Stop reason: SDUPTHER

## 2021-09-03 NOTE — PROGRESS NOTES
1. Benign localized prostatic hyperplasia with lower urinary tract symptoms (LUTS)               Plan:      LUTS worsening not at goal. On uroxatrol. Not helping  Discussed benefits of cystoscopy -- rule out stricture/bph   Follow up in six months      Prescriptions Ordered:  No orders of the defined types were placed in this encounter. Orders Placed:  No orders of the defined types were placed in this encounter.            Sandhya Munoz MD

## 2023-05-23 ENCOUNTER — OFFICE VISIT (OUTPATIENT)
Dept: UROLOGY | Age: 48
End: 2023-05-23
Payer: COMMERCIAL

## 2023-05-23 VITALS — BODY MASS INDEX: 28.12 KG/M2 | RESPIRATION RATE: 16 BRPM | WEIGHT: 175 LBS | HEIGHT: 66 IN

## 2023-05-23 DIAGNOSIS — N40.1 BENIGN LOCALIZED PROSTATIC HYPERPLASIA WITH LOWER URINARY TRACT SYMPTOMS (LUTS): Primary | ICD-10-CM

## 2023-05-23 PROCEDURE — 99214 OFFICE O/P EST MOD 30 MIN: CPT | Performed by: UROLOGY

## 2023-05-23 PROCEDURE — 1036F TOBACCO NON-USER: CPT | Performed by: UROLOGY

## 2023-05-23 PROCEDURE — G8419 CALC BMI OUT NRM PARAM NOF/U: HCPCS | Performed by: UROLOGY

## 2023-05-23 PROCEDURE — G8427 DOCREV CUR MEDS BY ELIG CLIN: HCPCS | Performed by: UROLOGY

## 2023-05-23 NOTE — PROGRESS NOTES
KENTRELL Hu MD        [id number] [place] [place] [facility] [id number]  Dept: [phone]  Dept Fax: [phone]: [address] [address] Urology Office Note -     Patient:  Moni Healy  YOB: 1975  Date: 5/23/2023    The patient is a 41 y.o. male who presents today for evaluation of the following problems:   Chief Complaint   Patient presents with    Other     Prostate issues  Weak stream     Urinary Frequency    Urinary Urgency    referred/consultation requested by Justin Mcdonough MD.    HISTORY OF PRESENT ILLNESS:     LUTS  Last seen 2021  uroxatrol was not helpful  Frustrated with voiding  Not at treatment goal  \"like a kink in a garden hose\"  Auass 23- not satisfied    Summary of Previous Records:  Note from 020/2/2021: Discontinue finasteride. I am starting him on Uroxatrol 10 mg p.o. nightly. I discussed retrograde ejaculation in detail as well as lightheadedness and other common side effects. Currently his LUTS are better but not dramatically so. I asked if he would be happy if he stayed this way and he stated he would be fine with this. I gave him literature on the UroLift procedure. If he wants to undergo cystoscopy to determine if UroLift is an option, he will call the office. Otherwise return in 6 months to see one of the other physicians. Requested/reviewed records from Justin Mcdonough MD office and/or outside physician/EMR    (Patient's old records have been requested, reviewed and pertinent findings summarized in today's note.)    Procedures Today: N/A      Last several PSA's:  Lab Results   Component Value Date    PSA 0.44 05/24/2020       Last total testosterone:  No results found for: TESTOSTERONE    Urinalysis today:  No results found for this visit on 05/23/23.     Last BUN and creatinine:  Lab Results   Component Value Date    BUN 14 06/04/2022     Lab Results   Component

## 2023-05-27 PROBLEM — N40.0 BENIGN PROSTATIC HYPERPLASIA: Status: ACTIVE | Noted: 2023-05-27

## 2023-05-27 PROBLEM — I10 ESSENTIAL HYPERTENSION: Status: ACTIVE | Noted: 2023-05-27

## 2023-05-27 PROBLEM — K21.9 GASTROESOPHAGEAL REFLUX DISEASE WITHOUT ESOPHAGITIS: Status: ACTIVE | Noted: 2023-05-27

## 2023-05-31 LAB — PSA, ULTRASENSITIVE: 0.52 NG/ML

## 2023-06-08 ENCOUNTER — PROCEDURE VISIT (OUTPATIENT)
Dept: UROLOGY | Age: 48
End: 2023-06-08
Payer: COMMERCIAL

## 2023-06-08 VITALS — WEIGHT: 150 LBS | RESPIRATION RATE: 16 BRPM | BODY MASS INDEX: 24.11 KG/M2 | HEIGHT: 66 IN

## 2023-06-08 DIAGNOSIS — N40.1 BENIGN LOCALIZED PROSTATIC HYPERPLASIA WITH LOWER URINARY TRACT SYMPTOMS (LUTS): Primary | ICD-10-CM

## 2023-06-08 PROCEDURE — 52000 CYSTOURETHROSCOPY: CPT | Performed by: UROLOGY

## 2023-06-08 NOTE — PROGRESS NOTES
Cystoscopy    Operative Note    Patient:  Rojelio Thao  MRN: [id number]  YOB: 1975    Date: 06/08/23  Surgeon: Cielo Arce MD  Anesthesia: Urojet Local  Indications:     LUTS worsening not at goal. Alpha blockers not helpful. Will give one month trial of gemtesa. Discussed benefits of cystoscopy -- rule out stricture/bph     Needs PSA      Gemtesa in one month  Cysto in office         Position: Supine  EBL: 0 ml    Findings:   The patient was prepped and draped in the usual sterile fashion. The flexible cystoscope was advanced through the urethra and into the bladder. The bladder was thoroughly inspected and the following was noted:    Residual Urine: significant\" \" . Urine clear, with no obvious infection  Urethra: No abnormalities of the urethra are noted. Urethral dilation was not performed. Prostate: trilobar hyperplasia+, obstruction noted, intravesical extension of prostate was present. There was no previous TURP defect. 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 Moderate trabeculation noted. + bladder diverticulum. Ureters: Orifices with normal configuration and location. The cystoscope was removed. The patient tolerated the procedure well. PSA normal  Cysto turbt, turp greenlight (40).  discussed retrograde ejaculation Needs paralysis

## 2023-07-31 ENCOUNTER — HOSPITAL ENCOUNTER (OUTPATIENT)
Age: 48
Discharge: HOME | End: 2023-07-31
Payer: COMMERCIAL

## 2023-07-31 ENCOUNTER — PREP FOR PROCEDURE (OUTPATIENT)
Dept: UROLOGY | Age: 48
End: 2023-07-31

## 2023-07-31 DIAGNOSIS — Z01.818 PRE-OP TESTING: ICD-10-CM

## 2023-07-31 DIAGNOSIS — N40.1 BENIGN LOCALIZED PROSTATIC HYPERPLASIA WITH LOWER URINARY TRACT SYMPTOMS (LUTS): ICD-10-CM

## 2023-07-31 PROCEDURE — 93005 ELECTROCARDIOGRAM TRACING: CPT

## 2023-08-01 PROCEDURE — 93010 ELECTROCARDIOGRAM REPORT: CPT | Performed by: INTERNAL MEDICINE

## 2023-08-01 RX ORDER — SODIUM CHLORIDE 0.9 % (FLUSH) 0.9 %
5-40 SYRINGE (ML) INJECTION EVERY 12 HOURS SCHEDULED
Status: CANCELLED | OUTPATIENT
Start: 2023-08-01

## 2023-08-01 RX ORDER — SODIUM CHLORIDE 9 MG/ML
INJECTION, SOLUTION INTRAVENOUS PRN
Status: CANCELLED | OUTPATIENT
Start: 2023-08-01

## 2023-08-01 RX ORDER — SODIUM CHLORIDE 0.9 % (FLUSH) 0.9 %
5-40 SYRINGE (ML) INJECTION PRN
Status: CANCELLED | OUTPATIENT
Start: 2023-08-01

## 2023-08-02 LAB
ABSOLUTE BASO #: 0.07 K/UL (ref 0–0.2)
ABSOLUTE EOS #: 0.35 K/UL (ref 0–0.5)
ABSOLUTE LYMPH #: 2.16 K/UL (ref 1–4)
ABSOLUTE MONO #: 0.48 K/UL (ref 0.2–1)
ABSOLUTE NEUT #: 5.27 K/UL (ref 1.5–7.5)
BASOPHILS RELATIVE PERCENT: 0.8 %
EOSINOPHILS RELATIVE PERCENT: 4.2 %
HCT VFR BLD CALC: 42.4 % (ref 40–51)
HEMOGLOBIN: 14 G/DL (ref 13.5–17)
LYMPHOCYTE %: 25.9 %
MCH RBC QN AUTO: 27.9 PG (ref 25–33)
MCHC RBC AUTO-ENTMCNC: 33 G/DL (ref 31–36)
MCV RBC AUTO: 84.6 FL (ref 80–99)
MONOCYTES # BLD: 5.7 %
NEUTROPHILS RELATIVE PERCENT: 63.2 %
PDW BLD-RTO: 12.9 % (ref 11.5–15)
PLATELETS: 264 K/UL (ref 130–400)
PMV BLD AUTO: 9.5 FL (ref 9.3–13)
RBC: 5.01 M/UL (ref 4.5–6.1)
WBC: 8.4 K/UL (ref 3.5–11)

## 2023-08-03 LAB
ANION GAP SERPL CALCULATED.3IONS-SCNC: 13 MEQ/L (ref 7–16)
BUN BLDV-MCNC: 19 MG/DL (ref 6–20)
CALCIUM SERPL-MCNC: 9.6 MG/DL (ref 8.5–10.5)
CHLORIDE BLD-SCNC: 102 MEQ/L (ref 95–107)
CO2: 27 MEQ/L (ref 19–31)
CREAT SERPL-MCNC: 0.98 MG/DL (ref 0.8–1.4)
EGFR IF NONAFRICAN AMERICAN: 96 ML/MIN/1.73
GLUCOSE: 97 MG/DL (ref 70–99)
POTASSIUM SERPL-SCNC: 4.4 MEQ/L (ref 3.5–5.4)
SODIUM BLD-SCNC: 142 MEQ/L (ref 133–146)

## 2023-08-04 LAB
EKG ATRIAL RATE: 54 BPM
EKG P AXIS: 58 DEGREES
EKG P-R INTERVAL: 140 MS
EKG Q-T INTERVAL: 422 MS
EKG QRS DURATION: 98 MS
EKG QTC CALCULATION (BAZETT): 400 MS
EKG R AXIS: 84 DEGREES
EKG T AXIS: 57 DEGREES
EKG VENTRICULAR RATE: 54 BPM

## 2023-08-05 LAB — URINE CULTURE, ROUTINE: NORMAL

## 2023-08-08 NOTE — PROGRESS NOTES
PAT Call Date: 8/9  Surgery Date: 8/16    Surgeon: Ananya Lizama   Surgery: Sharon DODSON    Is patient from a nursing home? No   Any Isolation Precautions? No   Any Pacemaker or ICD? No If YES, has it been checked recently and where? Has the rep been notified? No     On Snapboard?  No     Hard Copy on Chart  In EPIC Pending/Notes   Consent -   Within 30 days; signed, dated & timed by patient and physician     [x] On Arrival     [] Blood    Additional Consent Needs:     H&P - Within 30 days    [] Physician To Do     [] H&P Update - If H&P is older then 24 hours    Clearance -  Medical, Cardiac, Pulmonary, etc.       Orders - Signed and Dated    Copy Sent to Pharm []  8/16 surg tab  [] Physician To Do    Labs - Within 3 months   8/2  [x] CBC -ok   [x] BMP-ok   [] GFR   [] INR    [] PTT    [x] Urine -ok   [] Liver Enzymes    [] Kidney Function    [] MRSA Nasal   [] MSSA      Others:    Radiology Studies-   Within 1 year  N/a  [] Chest X-Ray   [] MRI    [] CT    EKG -   Within 1 year, unless hx of HTN  7/31 Sinus carrie   Cardiac Workup -   Stress Test, Echo, Cath within 18 months    [] Cath                                [] Stress Test                      [] Echo    [] Holter Monitor    [] VANIA

## 2023-08-09 RX ORDER — ESOMEPRAZOLE MAGNESIUM 40 MG/1
CAPSULE, DELAYED RELEASE ORAL DAILY
COMMUNITY
Start: 2023-07-19

## 2023-08-09 NOTE — PROGRESS NOTES
Follow all instructions given by your physician  NPO after midnight  Sips of water am of surgery with allowed medications  Bring insurance info and 's license  Wear clean comfortable , loose-fitting clothing  No jewelry or contact lenses to be worn day of surgery  No glue on dentures morning of surgery; you will be asked to remove them for surgery. Case for glasses. Shower the night before and the morning of surgery with a liquid antibacterial soap, dry with new fresh clean towel after each shower, no lotions, creams or powder. Clean sheets and pillowcase on bed night before surgery  Bring medications in original bottles  Bring CPAP/BIPAP machine if you have one ( you may be charged if one is needed in recovery room )    Our pharmacy has a Meds to [facility] program where they will deliver any new prescriptions you may have to your room before you leave. Our Pharmacy will clear it through your insurance; for example (same co pay). This enables you to take your new RX as soon as you need when you get home and avoids stop/wait delays on the way home. Please have a form of payment with you and have someone designated as your Pharmacy contact with their phone # as you may not feel well or still be under the influence of anesthesia. Please refer to the SSI-Surgical Site Infection Flyer you hopefully received in the mail-together we can prevent infections; signs and symptoms reviewed. When discharged be sure you understand how to care for your wound and that you have the Dr./office phone # to call if you have any concerns or questions about your wound.      needed at discharge and someone over 18 to stay with you for 24 hours overnight (surgery may be cancelled if you don't have this)  Report to [facility] on 2nd floor  If you would become ill prior to surgery, please call the surgeon  May have a visitor with you, we request that you limit to 2 visitors in pre-op area  Masks are recommended but not required, new

## 2023-08-13 ENCOUNTER — HOSPITAL ENCOUNTER (EMERGENCY)
Age: 48
Discharge: HOME | End: 2023-08-13
Payer: COMMERCIAL

## 2023-08-13 VITALS
BODY MASS INDEX: 23.78 KG/M2 | RESPIRATION RATE: 18 BRPM | TEMPERATURE: 98.4 F | HEART RATE: 74 BPM | WEIGHT: 148 LBS | SYSTOLIC BLOOD PRESSURE: 137 MMHG | OXYGEN SATURATION: 99 % | DIASTOLIC BLOOD PRESSURE: 88 MMHG | HEIGHT: 66 IN

## 2023-08-13 DIAGNOSIS — L23.7 POISON IVY DERMATITIS: Primary | ICD-10-CM

## 2023-08-13 PROCEDURE — 99213 OFFICE O/P EST LOW 20 MIN: CPT | Performed by: NURSE PRACTITIONER

## 2023-08-13 PROCEDURE — 99213 OFFICE O/P EST LOW 20 MIN: CPT

## 2023-08-13 RX ORDER — PREDNISONE 20 MG/1
20 TABLET ORAL 2 TIMES DAILY
Qty: 10 TABLET | Refills: 0 | Status: SHIPPED | OUTPATIENT
Start: 2023-08-13 | End: 2023-08-18

## 2023-08-13 ASSESSMENT — PAIN - FUNCTIONAL ASSESSMENT: PAIN_FUNCTIONAL_ASSESSMENT: NONE - DENIES PAIN

## 2023-08-13 ASSESSMENT — ENCOUNTER SYMPTOMS
COUGH: 0
NAUSEA: 0
VOMITING: 0
SORE THROAT: 0
SHORTNESS OF BREATH: 0
CHEST TIGHTNESS: 0
RHINORRHEA: 0
DIARRHEA: 0

## 2023-08-13 NOTE — ED NOTES
Pt with complaints of a rash to bilateral wrist and forearm rash. States he was in poison ivy yesterday. States he usually gets it bad and has a procedure coming up.      Beverley Kraus, LPN  [phone]

## 2023-08-14 ENCOUNTER — TELEPHONE (OUTPATIENT)
Dept: UROLOGY | Age: 48
End: 2023-08-14

## 2023-08-14 NOTE — TELEPHONE ENCOUNTER
Patient is having CYSTOSCOPY, TRANSURETHRAL RESECTION OF BLADDER TUMOR, TRANSURETHRAL RESECTION OF PROSTATE, GREENLIGHT PHOTO VAPORIZATION OF PROSTATE on the 16th. He left a message stating the he has poison Ivy and went to urgent care and they did not want to give him anything orally because of his upcoming procedure. Patent is wanting to know if there is anything he can take or does he just need to wait until after the procedure?

## 2023-08-15 NOTE — TELEPHONE ENCOUNTER
Fine to apply topical steroid cream as prescribed by Hali An CNP. Would prefer that he held off on Prednisone until after surgery. OTC Calamine lotion can help with symptoms and prevent spread.

## 2023-08-16 ENCOUNTER — ANESTHESIA EVENT (OUTPATIENT)
Dept: OPERATING ROOM | Age: 48
End: 2023-08-16
Payer: COMMERCIAL

## 2023-08-16 ENCOUNTER — HOSPITAL ENCOUNTER (OUTPATIENT)
Age: 48
Setting detail: OUTPATIENT SURGERY
Discharge: HOME | End: 2023-08-16
Attending: UROLOGY | Admitting: UROLOGY
Payer: COMMERCIAL

## 2023-08-16 ENCOUNTER — ANESTHESIA (OUTPATIENT)
Dept: OPERATING ROOM | Age: 48
End: 2023-08-16
Payer: COMMERCIAL

## 2023-08-16 VITALS
BODY MASS INDEX: 24.43 KG/M2 | HEIGHT: 66 IN | RESPIRATION RATE: 16 BRPM | DIASTOLIC BLOOD PRESSURE: 81 MMHG | OXYGEN SATURATION: 98 % | WEIGHT: 152 LBS | TEMPERATURE: 97.1 F | SYSTOLIC BLOOD PRESSURE: 135 MMHG | HEART RATE: 58 BPM

## 2023-08-16 DIAGNOSIS — N13.8 BENIGN PROSTATIC HYPERPLASIA WITH URINARY OBSTRUCTION: ICD-10-CM

## 2023-08-16 DIAGNOSIS — N40.1 BENIGN PROSTATIC HYPERPLASIA WITH URINARY OBSTRUCTION: ICD-10-CM

## 2023-08-16 DIAGNOSIS — G89.18 POSTOPERATIVE PAIN: Primary | ICD-10-CM

## 2023-08-16 PROCEDURE — 6360000002 HC RX W HCPCS: Performed by: UROLOGY

## 2023-08-16 PROCEDURE — 3700000001 HC ADD 15 MINUTES (ANESTHESIA): Performed by: UROLOGY

## 2023-08-16 PROCEDURE — 7100000001 HC PACU RECOVERY - ADDTL 15 MIN: Performed by: UROLOGY

## 2023-08-16 PROCEDURE — 3700000000 HC ANESTHESIA ATTENDED CARE: Performed by: UROLOGY

## 2023-08-16 PROCEDURE — 6360000002 HC RX W HCPCS: Performed by: ANESTHESIOLOGY

## 2023-08-16 PROCEDURE — 2720000010 HC SURG SUPPLY STERILE: Performed by: UROLOGY

## 2023-08-16 PROCEDURE — 3600000013 HC SURGERY LEVEL 3 ADDTL 15MIN: Performed by: UROLOGY

## 2023-08-16 PROCEDURE — 88305 TISSUE EXAM BY PATHOLOGIST: CPT

## 2023-08-16 PROCEDURE — 7100000011 HC PHASE II RECOVERY - ADDTL 15 MIN: Performed by: UROLOGY

## 2023-08-16 PROCEDURE — 2709999900 HC NON-CHARGEABLE SUPPLY: Performed by: UROLOGY

## 2023-08-16 PROCEDURE — 7100000000 HC PACU RECOVERY - FIRST 15 MIN: Performed by: UROLOGY

## 2023-08-16 PROCEDURE — 3600000003 HC SURGERY LEVEL 3 BASE: Performed by: UROLOGY

## 2023-08-16 PROCEDURE — 2580000003 HC RX 258: Performed by: UROLOGY

## 2023-08-16 PROCEDURE — 2500000003 HC RX 250 WO HCPCS: Performed by: ANESTHESIOLOGY

## 2023-08-16 PROCEDURE — 7100000010 HC PHASE II RECOVERY - FIRST 15 MIN: Performed by: UROLOGY

## 2023-08-16 PROCEDURE — C9399 UNCLASSIFIED DRUGS OR BIOLOG: HCPCS | Performed by: ANESTHESIOLOGY

## 2023-08-16 RX ORDER — FENTANYL CITRATE 50 UG/ML
INJECTION, SOLUTION INTRAMUSCULAR; INTRAVENOUS PRN
Status: DISCONTINUED | OUTPATIENT
Start: 2023-08-16 | End: 2023-08-16 | Stop reason: SDUPTHER

## 2023-08-16 RX ORDER — DEXAMETHASONE SODIUM PHOSPHATE 10 MG/ML
INJECTION, EMULSION INTRAMUSCULAR; INTRAVENOUS PRN
Status: DISCONTINUED | OUTPATIENT
Start: 2023-08-16 | End: 2023-08-16 | Stop reason: SDUPTHER

## 2023-08-16 RX ORDER — HYDROCODONE BITARTRATE AND ACETAMINOPHEN 5; 325 MG/1; MG/1
1 TABLET ORAL EVERY 6 HOURS PRN
Qty: 18 TABLET | Refills: 0 | Status: SHIPPED | OUTPATIENT
Start: 2023-08-16 | End: 2023-08-21

## 2023-08-16 RX ORDER — PROPOFOL 10 MG/ML
INJECTION, EMULSION INTRAVENOUS PRN
Status: DISCONTINUED | OUTPATIENT
Start: 2023-08-16 | End: 2023-08-16 | Stop reason: SDUPTHER

## 2023-08-16 RX ORDER — ONDANSETRON 2 MG/ML
INJECTION INTRAMUSCULAR; INTRAVENOUS PRN
Status: DISCONTINUED | OUTPATIENT
Start: 2023-08-16 | End: 2023-08-16 | Stop reason: SDUPTHER

## 2023-08-16 RX ORDER — SODIUM CHLORIDE 9 MG/ML
INJECTION, SOLUTION INTRAVENOUS PRN
Status: DISCONTINUED | OUTPATIENT
Start: 2023-08-16 | End: 2023-08-16 | Stop reason: HOSPADM

## 2023-08-16 RX ORDER — SODIUM CHLORIDE 0.9 % (FLUSH) 0.9 %
5-40 SYRINGE (ML) INJECTION EVERY 12 HOURS SCHEDULED
Status: DISCONTINUED | OUTPATIENT
Start: 2023-08-16 | End: 2023-08-16 | Stop reason: HOSPADM

## 2023-08-16 RX ORDER — PHENAZOPYRIDINE HYDROCHLORIDE 200 MG/1
200 TABLET, FILM COATED ORAL 3 TIMES DAILY PRN
Qty: 15 TABLET | Refills: 0 | Status: SHIPPED | OUTPATIENT
Start: 2023-08-16 | End: 2023-08-21

## 2023-08-16 RX ORDER — SODIUM CHLORIDE 0.9 % (FLUSH) 0.9 %
5-40 SYRINGE (ML) INJECTION PRN
Status: DISCONTINUED | OUTPATIENT
Start: 2023-08-16 | End: 2023-08-16 | Stop reason: HOSPADM

## 2023-08-16 RX ORDER — DEXTROSE MONOHYDRATE 100 MG/ML
INJECTION, SOLUTION INTRAVENOUS CONTINUOUS PRN
Status: DISCONTINUED | OUTPATIENT
Start: 2023-08-16 | End: 2023-08-16 | Stop reason: HOSPADM

## 2023-08-16 RX ORDER — ACETAMINOPHEN 325 MG/1
650 TABLET ORAL ONCE
Status: DISCONTINUED | OUTPATIENT
Start: 2023-08-16 | End: 2023-08-16 | Stop reason: HOSPADM

## 2023-08-16 RX ORDER — DROPERIDOL 2.5 MG/ML
0.62 INJECTION, SOLUTION INTRAMUSCULAR; INTRAVENOUS
Status: DISCONTINUED | OUTPATIENT
Start: 2023-08-16 | End: 2023-08-16 | Stop reason: HOSPADM

## 2023-08-16 RX ORDER — DIPHENHYDRAMINE HYDROCHLORIDE 50 MG/ML
12.5 INJECTION INTRAMUSCULAR; INTRAVENOUS
Status: DISCONTINUED | OUTPATIENT
Start: 2023-08-16 | End: 2023-08-16 | Stop reason: HOSPADM

## 2023-08-16 RX ORDER — LABETALOL HYDROCHLORIDE 5 MG/ML
10 INJECTION INTRAVENOUS
Status: DISCONTINUED | OUTPATIENT
Start: 2023-08-16 | End: 2023-08-16 | Stop reason: HOSPADM

## 2023-08-16 RX ORDER — IPRATROPIUM BROMIDE AND ALBUTEROL SULFATE 2.5; .5 MG/3ML; MG/3ML
1 SOLUTION RESPIRATORY (INHALATION)
Status: DISCONTINUED | OUTPATIENT
Start: 2023-08-16 | End: 2023-08-16 | Stop reason: HOSPADM

## 2023-08-16 RX ORDER — OXYCODONE HYDROCHLORIDE 5 MG/1
5 TABLET ORAL
Status: DISCONTINUED | OUTPATIENT
Start: 2023-08-16 | End: 2023-08-16 | Stop reason: HOSPADM

## 2023-08-16 RX ORDER — ROCURONIUM BROMIDE 10 MG/ML
INJECTION, SOLUTION INTRAVENOUS PRN
Status: DISCONTINUED | OUTPATIENT
Start: 2023-08-16 | End: 2023-08-16 | Stop reason: SDUPTHER

## 2023-08-16 RX ORDER — HYDRALAZINE HYDROCHLORIDE 20 MG/ML
10 INJECTION INTRAMUSCULAR; INTRAVENOUS
Status: DISCONTINUED | OUTPATIENT
Start: 2023-08-16 | End: 2023-08-16 | Stop reason: HOSPADM

## 2023-08-16 RX ORDER — ONDANSETRON 2 MG/ML
4 INJECTION INTRAMUSCULAR; INTRAVENOUS
Status: DISCONTINUED | OUTPATIENT
Start: 2023-08-16 | End: 2023-08-16 | Stop reason: HOSPADM

## 2023-08-16 RX ORDER — CIPROFLOXACIN 500 MG/1
500 TABLET, FILM COATED ORAL 2 TIMES DAILY
Qty: 10 TABLET | Refills: 0 | Status: SHIPPED | OUTPATIENT
Start: 2023-08-16 | End: 2023-08-21

## 2023-08-16 RX ORDER — FENTANYL CITRATE 50 UG/ML
50 INJECTION, SOLUTION INTRAMUSCULAR; INTRAVENOUS EVERY 5 MIN PRN
Status: DISCONTINUED | OUTPATIENT
Start: 2023-08-16 | End: 2023-08-16 | Stop reason: HOSPADM

## 2023-08-16 RX ADMIN — SUGAMMADEX 138 MG: 100 INJECTION, SOLUTION INTRAVENOUS at 13:01

## 2023-08-16 RX ADMIN — PROPOFOL 150 MG: 10 INJECTION, EMULSION INTRAVENOUS at 12:32

## 2023-08-16 RX ADMIN — ROCURONIUM BROMIDE 40 MG: 10 INJECTION INTRAVENOUS at 12:32

## 2023-08-16 RX ADMIN — FENTANYL CITRATE 100 MCG: 50 INJECTION, SOLUTION INTRAMUSCULAR; INTRAVENOUS at 12:32

## 2023-08-16 RX ADMIN — SODIUM CHLORIDE: 9 INJECTION, SOLUTION INTRAVENOUS at 11:41

## 2023-08-16 RX ADMIN — ONDANSETRON 4 MG: 2 INJECTION INTRAMUSCULAR; INTRAVENOUS at 12:46

## 2023-08-16 RX ADMIN — DEXAMETHASONE SODIUM PHOSPHATE 8 MG: 10 INJECTION, EMULSION INTRAMUSCULAR; INTRAVENOUS at 12:40

## 2023-08-16 RX ADMIN — Medication 2000 MG: at 12:30

## 2023-08-16 ASSESSMENT — PAIN - FUNCTIONAL ASSESSMENT: PAIN_FUNCTIONAL_ASSESSMENT: 0-10

## 2023-08-16 ASSESSMENT — PAIN SCALES - GENERAL
PAINLEVEL_OUTOF10: 5
PAINLEVEL_OUTOF10: 3
PAINLEVEL_OUTOF10: 3
PAINLEVEL_OUTOF10: 0

## 2023-08-16 ASSESSMENT — PAIN DESCRIPTION - DESCRIPTORS: DESCRIPTORS: ACHING

## 2023-08-16 ASSESSMENT — PAIN DESCRIPTION - LOCATION: LOCATION: PENIS

## 2023-08-16 ASSESSMENT — PAIN DESCRIPTION - FREQUENCY: FREQUENCY: CONTINUOUS

## 2023-08-16 NOTE — PROGRESS NOTES
1309: pt arrives to pacu. Pt on room air. Pt responds to verbal stimulation. VSS. Respirations unlabored. CBI running fast and turned down to a moderate rate. Bag one has 1600m left. Bag two has 1300ml left. 950ml emptied from becerra-clear dilute urine. 1316: pt resting in bed. Pt denies any needs at this time   1326: pt resting in bed. Pt denies pain or any needs   1339: 700ml emptied from becerra, cbi turned down to slow. Bag one has 800ml left. Bag two has 1300ml left. Pt meets discharge criteria from pacu.  Pt transported to [facility]

## 2023-08-16 NOTE — DISCHARGE INSTRUCTIONS
-OK to dc home.  -Home with becerra; you may see some blood in urine in the tubing and this is normal as long as the catheter is draining well  -If catheter does not draining, call the office or report to the ER  -Stay well hydrated  -No heavy lifting >20lbs for 6 weeks  -You may see blood with urination on and off for 4 weeks, this is normal and will improve. The most important thing is if the catheter is draining and you are able to empty your bladder.  -You may have burning with urination on and off for 2-3 weeks, this is normal and should improve  - Report to the ER if chest pain, shortness of breath, fever >101.5  - You may take tylenol or motrin OTC as needed for pain  - Take antibiotics as prescribed the day before catheter removal  - Make sure you take stool softeners so that you are not straining during bowel movements    Pt will Follow up with Sallie Swann MD for a void trial. Call office for follow up. You may resume your blood thinners in 72 hours.

## 2023-08-16 NOTE — PROGRESS NOTES
Pt returned to [facility] room 4. Vitals and assessment as charted. 0.9 infusing, @950ml to count from PACU. Pt has water. Family at the bedside. Pt and family verbalized understanding of discharge criteria and call light use. Call light in reach.

## 2023-08-16 NOTE — PROGRESS NOTES
Patient oriented to Same Day department and admitted to Same Day Surgery room 4. Patient verbalized approval for first name, last initial with physician name on unit whiteboard. Plan of care reviewed with patient. Patient room whiteboard filled out and discussed with patient and responsible adult. Patient and responsible adult offered Same Day Welcome Packet to review. Call light in reach. Bed in lowest position, locked, with one bed rail up. SCDs and warming blanket in place. Appropriate arm bands on patient. Bathroom offered. All questions and concerns of patient addressed. Meds to Beds:   Patient informed of  [facility] Meds to [facility] program during admission.  Patient is agreeable to program.   Contact information for the pharmacy and the Meds to [facility] program:   Name: Leanna Acevedo   Relationship to patient:spouse/significant other   Phone number: [phone]

## 2023-08-16 NOTE — ANESTHESIA PRE PROCEDURE
270 05/24/2020 12:00 AM       CMP:   Lab Results   Component Value Date/Time     08/02/2023 04:59 PM    K 4.4 08/02/2023 04:59 PM     08/02/2023 04:59 PM    CO2 27 08/02/2023 04:59 PM    BUN 19 08/02/2023 04:59 PM    CREATININE 0.98 08/02/2023 04:59 PM    LABGLOM >90 03/26/2016 10:50 AM    GLUCOSE 97 08/02/2023 04:59 PM    CALCIUM 9.6 08/02/2023 04:59 PM       POC Tests: No results for input(s): POCGLU, POCNA, POCK, POCCL, POCBUN, POCHEMO, POCHCT in the last 72 hours. Coags:   Lab Results   Component Value Date/Time    INR 0.84 08/16/2013 11:16 AM    APTT 32.8 08/16/2013 11:16 AM       HCG (If Applicable): No results found for: PREGTESTUR, PREGSERUM, HCG, HCGQUANT     ABGs: No results found for: PHART, PO2ART, JUA0XHC, MIS8ROE, BEART, Q5XYZSKN     Type & Screen (If Applicable):  No results found for: LABABO, LABRH    Drug/Infectious Status (If Applicable):  No results found for: HIV, HEPCAB    COVID-19 Screening (If Applicable):   Lab Results   Component Value Date/Time    COVID19 Positive 06/12/2021 08:46 AM    COVID19 neg 04/16/2021 08:53 AM           Anesthesia Evaluation  Patient summary reviewed and Nursing notes reviewed no history of anesthetic complications:   Airway: Mallampati: II          Dental:          Pulmonary: breath sounds clear to auscultation                             Cardiovascular:    (+) hypertension:,         Rhythm: regular  Rate: normal                    Neuro/Psych:               GI/Hepatic/Renal:   (+) GERD: no interval change,           Endo/Other:                     Abdominal:       Abdomen: soft. Vascular: Other Findings:           Anesthesia Plan      general     ASA 2       Induction: intravenous. MIPS: Postoperative opioids intended and Prophylactic antiemetics administered. Anesthetic plan and risks discussed with patient and spouse.                         [address] [facility], DO   8/16/2023

## 2023-08-16 NOTE — H&P
Randy Mckeon MD  History and Physical    Patient:  Estuardo Toledo  MRN: [id number]  YOB: 1975    HISTORY OF PRESENT ILLNESS:     The patient is a 57 y.o. male who presents with bph and bladder tumor. Here for procedure. Patient's old records, notes and chart reviewed and summarized above. Randy Mckeon MD independently reviewed the images and verified the radiology reports from:    No results found. Past Medical History:    Past Medical History:   Diagnosis Date    Essential hypertension 05/27/2023    GERD (gastroesophageal reflux disease)     hiatal hernia    High blood pressure     Urinary retention     Dr. Sudhakar Zhang       Past Surgical History:    Past Surgical History:   Procedure Laterality Date    BACK SURGERY      11years ago    COLONOSCOPY  02/2021    Dr. Hannah Vaca  03/01/2021    s/p I and D right axillary abscess in office Dr Arely Silveira  2017    Dr. Maynor Reyes-- Hiatal hernia    UPPER GASTROINTESTINAL ENDOSCOPY  2023       Medications Prior to Admission:    Prior to Admission medications    Medication Sig Start Date End Date Taking? Authorizing Provider   predniSONE (DELTASONE) 20 MG tablet Take 1 tablet by mouth 2 times daily for 5 days 8/13/23 8/18/23  MIGUEL Cobb CNP   betamethasone valerate (VALISONE) 0.1 % ointment Apply topically 2 times daily. 8/13/23   MIGUEL Cobb CNP   esomeprazole (NEXIUM) 40 MG delayed release capsule Take by mouth daily 7/19/23   Historical Provider, MD   losartan (COZAAR) 50 MG tablet Take 1 tablet by mouth daily  Patient taking differently: Take 0.5 tablets by mouth at bedtime 6/2/23   Samara Dietrich MD       Allergies:  Patient has no known allergies.     Social History:    Social History     Socioeconomic History    Marital status:      Spouse name: Not on file    Number of children: Not on file    Years of education: Not on file    Highest education

## 2023-08-16 NOTE — BRIEF OP NOTE
Brief Postoperative Note      Patient: Lisa Casillas  YOB: 1975  MRN: [id number]    Date of Procedure: 8/16/2023    Pre-Op Diagnosis Codes: * Benign prostatic hyperplasia with urinary obstruction [N40.1, N13.8]    Post-Op Diagnosis: Same       Procedure(s):  CYSTOSCOPY, GREENLIGHT PHOTO VAPORIZATION OF PROSTATE    Surgeon(s):  Freeman France MD    Assistant:  * No surgical staff found *    Anesthesia: General    Estimated Blood Loss (mL): Minimal    Complications: None    Specimens:   ID Type Source Tests Collected by Time Destination   A : Prostate Chips Tissue Prostate SURGICAL PATHOLOGY Freeman France MD 8/16/2023 1301        Implants:  * No implants in log *      Drains:   Urinary Catheter 08/16/23 3 Way (Active)       Findings: catheter out tomorrow. [place] 2-3 weeks with pvr. Did not need to do turbt.  Jemima Thomson 4 mo      Electronically signed by Kathy Carbone MD on 8/16/2023 at 1:10 PM

## 2023-08-16 NOTE — OP NOTE
[id number] [facility]    DATE: 8/16/2023  Patient:  Willis Castrejon  MRN: [id number]  YOB: 1975    SURGEON: Yojana Franco MD.    ASSISTANT: none    PREOPERATIVE DIAGNOSIS: BPH with outlet obstruction    POSTOPERATIVE DIAGNOSIS: BPH with outlet obstruction    PROCEDURE PERFORMED:  Cystoscopy, Greenlight Photovaporization of Prostate,     TURP    ANESTHESIA: General    COMPLICATIONS: none    OR BLOOD LOSS:  Minimal    FLUIDS: Cystalloids per Anesthesia    SPECIMENS:  ID Type Source Tests Collected by Time Destination   A : Prostate Chips Tissue Prostate SURGICAL PATHOLOGY Roscoe Wheatley MD 8/16/2023 1301      none    DRAINS: [address] becerra    INDICATIONS FOR PROCEDURE:  The patient is a 57 y.o. male who presents today with Benign prostatic hyperplasia with urinary obstruction [N40.1, N13.8] here for CYSTOSCOPY, GREENLIGHT PHOTO VAPORIZATION OF PROSTATE. After risks, benefits and alternatives of the procedure were discussed with the patient, the patient elected to proceed. DETAILS OF PROCEDURE:  After informed consent was obtained in the preoperative area, the patient was taken back to the operating room and transferred to the operating table in supine position. EPC cuffs were placed. The machine was turned on. Anesthesia was induced and antibiotics were given. The patient was placed in modified dorsal lithotomy position and sterilely prepped and draped in a standard fashion. A timeout occurred. Two patient identifiers were used. The 25F rigid scope with the visual obturator was carefully advanced through the urethra. .  Once within the bladder the visual obturator was exchanged for the resection bridge. The ureteral orifices were located and noted to be remote from the bladder neck. there was no tumor located. The previously noted area was inflammation from cystitis. We did resect the median lobe with our bladder loop.     The

## 2023-08-16 NOTE — ANESTHESIA POSTPROCEDURE EVALUATION
Department of Anesthesiology  Postprocedure Note    Patient: Sgeundo Jacinto  MRN: [id number]  YOB: 1975  Date of evaluation: 8/16/2023      Procedure Summary     Date: 08/16/23 Room / Location: [address]    Anesthesia Start: 1229 Anesthesia Stop: 7481    Procedure: CYSTOSCOPY, GREENLIGHT PHOTO VAPORIZATION OF PROSTATE Diagnosis:       Benign prostatic hyperplasia with urinary obstruction      (Benign prostatic hyperplasia with urinary obstruction [N40.1, N13.8])    Surgeons: Enriqueta Oneal MD Responsible Provider: Niles Aguilar DO    Anesthesia Type: general ASA Status: 2          Anesthesia Type: No value filed.     Lencho Phase I: Lencho Score: 10    Lencho Phase II: Lencho Score: 10      Anesthesia Post Evaluation    Patient location during evaluation: PACU  Patient participation: complete - patient participated  Level of consciousness: awake  Airway patency: patent  Nausea & Vomiting: no nausea  Complications: no  Cardiovascular status: hemodynamically stable  Respiratory status: acceptable  Hydration status: stable  Pain management: adequate

## 2023-08-17 ENCOUNTER — NURSE ONLY (OUTPATIENT)
Dept: UROLOGY | Age: 48
End: 2023-08-17

## 2023-08-17 DIAGNOSIS — N40.1 BENIGN LOCALIZED PROSTATIC HYPERPLASIA WITH LOWER URINARY TRACT SYMPTOMS (LUTS): Primary | ICD-10-CM

## 2023-08-17 PROCEDURE — NBSRV NON-BILLABLE SERVICE: Performed by: NURSE PRACTITIONER

## 2023-08-17 NOTE — PROGRESS NOTES
Patient has given me verbal consent to perform becerra removal  Yes    27cc of water deflated from becerra balloon. 22 Fr becerra removed without difficulty. Pt will drink fluids and report to ER in 6-8 hours if patient unable to urinate. F/u with provider TOYA Aguilera.

## 2023-08-22 ENCOUNTER — TELEPHONE (OUTPATIENT)
Dept: UROLOGY | Age: 48
End: 2023-08-22

## 2023-08-22 NOTE — TELEPHONE ENCOUNTER
Patient returned to work on Monday, he noticed more blood in the urine and starting passing clots today. The urine is orange cranberry color. Advised he maybe noticing more blood in the urine from increased activity by going back to work. He voiced understanding to increase his fluids and if the urine becomes thick like ketchup or develops clots that he is unable to pass to present to the ER for evaluation. He will continue to monitor and call the office if the hematuria worsens.

## 2023-08-22 NOTE — TELEPHONE ENCOUNTER
Check UA and culture. Should present to the ED if hematuria worsens through the night. Recommend evaluation in the am with admin person.      The patient should go to the ED should they develop fever, chills, nausea, vomiting, chest pain, SOB, calf pain, feelings of incomplete emptying, or should they otherwise feel they need evaluated      Otherwise, should f/u as scheduled

## 2023-08-23 ENCOUNTER — OFFICE VISIT (OUTPATIENT)
Dept: UROLOGY | Age: 48
End: 2023-08-23
Payer: COMMERCIAL

## 2023-08-23 VITALS — RESPIRATION RATE: 16 BRPM | WEIGHT: 154.6 LBS | BODY MASS INDEX: 24.85 KG/M2 | HEIGHT: 66 IN

## 2023-08-23 DIAGNOSIS — N40.1 BENIGN LOCALIZED PROSTATIC HYPERPLASIA WITH LOWER URINARY TRACT SYMPTOMS (LUTS): Primary | ICD-10-CM

## 2023-08-23 DIAGNOSIS — R31.9 HEMATURIA, UNSPECIFIED TYPE: ICD-10-CM

## 2023-08-23 LAB
BILIRUBIN URINE: NEGATIVE
BLOOD URINE, POC: ABNORMAL
CHARACTER, URINE: ABNORMAL
COLOR, URINE: ABNORMAL
GLUCOSE URINE: NEGATIVE MG/DL
KETONES, URINE: NEGATIVE
LEUKOCYTE CLUMPS, URINE: ABNORMAL
NITRITE, URINE: NEGATIVE
PH, URINE: 5 (ref 5–9)
POST VOID RESIDUAL (PVR): 15 ML
PROTEIN, URINE: 100 MG/DL
SPECIFIC GRAVITY, URINE: <= 1.005 (ref 1–1.03)
UROBILINOGEN, URINE: 0.2 EU/DL (ref 0–1)

## 2023-08-23 PROCEDURE — 51798 US URINE CAPACITY MEASURE: CPT | Performed by: NURSE PRACTITIONER

## 2023-08-23 PROCEDURE — 99024 POSTOP FOLLOW-UP VISIT: CPT | Performed by: NURSE PRACTITIONER

## 2023-08-23 PROCEDURE — 81003 URINALYSIS AUTO W/O SCOPE: CPT | Performed by: NURSE PRACTITIONER

## 2023-08-23 RX ORDER — SULFAMETHOXAZOLE AND TRIMETHOPRIM 800; 160 MG/1; MG/1
1 TABLET ORAL 2 TIMES DAILY
Qty: 20 TABLET | Refills: 0 | Status: SHIPPED | OUTPATIENT
Start: 2023-08-23 | End: 2023-09-02

## 2023-08-23 NOTE — PROGRESS NOTES
Urine Trace (A) NEGATIVE    Color, Urine Red (A) YELLOW-STRAW    Character, Urine Cloudy (A) CLR-SL.CLOUD   poct post void residual   Result Value Ref Range    post void residual 15 ml         Patients recent PSA values are as follows  Lab Results   Component Value Date    PSA 0.44 05/24/2020        Recent BUN/Creatinine:  Lab Results   Component Value Date/Time    BUN 19 08/02/2023 04:59 PM    CREATININE 0.98 08/02/2023 04:59 PM       Radiology  No recent images reviewed. Assessment/Plan:   1. Benign localized prostatic hyperplasia with lower urinary tract symptoms (LUTS)  - Doing well, s/p Cystoscopy, Greenlight Photovaporization of Prostate, TURP  - Urine flow greatly improved. - PVR 15  - poct post void residual    2. Hematuria, unspecified type  Reassurance provided, Bactrim sent empirically. May need adjusted pending urinae culture. - POCT Urinalysis No Micro (Auto)  - Culture, Urine    -Patient has no other questions, comments, or concerns.   -They agree with and understand the plan of care. -The patient was encouraged to call the office or seek emergency care should this change.       Shabana Barahona, MIGUEL - CNP

## 2023-08-25 LAB — BACTERIA UR CULT: NORMAL

## 2023-09-05 NOTE — PROGRESS NOTES
images reviewed. Assessment/Plan:   1. Benign localized prostatic hyperplasia with lower urinary tract symptoms (LUTS)  - Doing well, s/p Cystoscopy, Greenlight Photovaporization of Prostate  - PVR 38ml  - Follow-up 3 months with Dr. Yordan Locke  - POCT Urinalysis No Micro (Auto)  - poct post void residual  - Culture, Urine    -Patient has no other questions, comments, or concerns.   -They agree with and understand the plan of care. -The patient was encouraged to call the office or seek emergency care should this change.       MIGUEL Vale - CNP

## 2023-09-06 ENCOUNTER — OFFICE VISIT (OUTPATIENT)
Dept: UROLOGY | Age: 48
End: 2023-09-06
Payer: COMMERCIAL

## 2023-09-06 VITALS — WEIGHT: 154 LBS | HEIGHT: 66 IN | RESPIRATION RATE: 18 BRPM | BODY MASS INDEX: 24.75 KG/M2

## 2023-09-06 DIAGNOSIS — N40.1 BENIGN LOCALIZED PROSTATIC HYPERPLASIA WITH LOWER URINARY TRACT SYMPTOMS (LUTS): Primary | ICD-10-CM

## 2023-09-06 LAB
BILIRUBIN URINE: NEGATIVE
BLOOD URINE, POC: ABNORMAL
CHARACTER, URINE: CLEAR
COLOR, URINE: YELLOW
GLUCOSE URINE: NEGATIVE MG/DL
KETONES, URINE: NEGATIVE
LEUKOCYTE CLUMPS, URINE: ABNORMAL
NITRITE, URINE: NEGATIVE
PH, URINE: 5.5 (ref 5–9)
POST VOID RESIDUAL (PVR): 38 ML
PROTEIN, URINE: 30 MG/DL
SPECIFIC GRAVITY, URINE: >= 1.03 (ref 1–1.03)
UROBILINOGEN, URINE: 0.2 EU/DL (ref 0–1)

## 2023-09-06 PROCEDURE — 51798 US URINE CAPACITY MEASURE: CPT | Performed by: NURSE PRACTITIONER

## 2023-09-06 PROCEDURE — 99024 POSTOP FOLLOW-UP VISIT: CPT | Performed by: NURSE PRACTITIONER

## 2023-09-06 PROCEDURE — 81003 URINALYSIS AUTO W/O SCOPE: CPT | Performed by: NURSE PRACTITIONER

## 2023-09-08 LAB — BACTERIA UR CULT: NORMAL

## 2023-12-12 ENCOUNTER — OFFICE VISIT (OUTPATIENT)
Dept: UROLOGY | Age: 48
End: 2023-12-12
Payer: COMMERCIAL

## 2023-12-12 VITALS — BODY MASS INDEX: 25.39 KG/M2 | HEIGHT: 66 IN | RESPIRATION RATE: 16 BRPM | WEIGHT: 158 LBS

## 2023-12-12 DIAGNOSIS — N40.1 BENIGN LOCALIZED PROSTATIC HYPERPLASIA WITH LOWER URINARY TRACT SYMPTOMS (LUTS): Primary | ICD-10-CM

## 2023-12-12 PROCEDURE — 99213 OFFICE O/P EST LOW 20 MIN: CPT | Performed by: UROLOGY

## 2023-12-12 NOTE — PROGRESS NOTES
this Office Visit:   Pat Dubose MD independently reviewed the images and verified the radiology reports from:    No results found. PAST MEDICAL, FAMILY AND SOCIAL HISTORY:  Past Medical History:   Diagnosis Date    Essential hypertension 05/27/2023    GERD (gastroesophageal reflux disease)     hiatal hernia    High blood pressure     Urinary retention     Dr. Dale Rosa     Past Surgical History:   Procedure Laterality Date    BACK SURGERY      11years ago    COLONOSCOPY  02/2021    Dr. Lucio Bloodgood  03/01/2021    s/p I and D right axillary abscess in office Dr Nonda Mortimer    TURP N/A 8/16/2023    CYSTOSCOPY, GREENLIGHT PHOTO VAPORIZATION OF PROSTATE performed by Sandra Lundy MD at [facility]  2017    Dr. Vidya Oswald-- Hiatal hernia    UPPER GASTROINTESTINAL ENDOSCOPY  2023     Family History   Problem Relation Age of Onset    Heart Disease Father     Heart Surgery Father     Diabetes Father     High Blood Pressure Father      Outpatient Medications Marked as Taking for the 12/12/23 encounter (Office Visit) with Sandra Lundy MD   Medication Sig Dispense Refill    esomeprazole ([place]) 40 MG delayed release capsule Take by mouth daily      losartan (COZAAR) 50 MG tablet Take 1 tablet by mouth daily 90 tablet 3       Patient has no known allergies.   Social History     Tobacco Use   Smoking Status Never   Smokeless Tobacco Never      (If patient a smoker, smoking cessation counseling offered)   Social History     Substance and Sexual Activity   Alcohol Use No       REVIEW OF SYSTEMS:  Constitutional: negative  Eyes: negative  Respiratory: negative  Cardiovascular: negative  Gastrointestinal: negative  Genitourinary: see HPI  Musculoskeletal: negative  Skin: negative   Neurological: negative  Hematological/Lymphatic: negative  Psychological: negative      Physical Exam:    This a 57 y.o. male  Vitals:    12/12/23 1305   Resp: 16     Body mass index is 25.5

## 2024-06-14 PROBLEM — L02.411 ABSCESS OF RIGHT AXILLA: Status: RESOLVED | Noted: 2021-03-01 | Resolved: 2024-06-14

## 2024-06-15 ENCOUNTER — HOSPITAL ENCOUNTER (OUTPATIENT)
Age: 49
Discharge: HOME | End: 2024-06-15

## 2024-06-15 DIAGNOSIS — Z82.49 FHX: HEART DISEASE: ICD-10-CM

## 2024-06-15 DIAGNOSIS — R07.9 CHEST PAIN, UNSPECIFIED TYPE: ICD-10-CM

## 2024-06-16 LAB
EKG ATRIAL RATE: 56 BPM
EKG P AXIS: 47 DEGREES
EKG P-R INTERVAL: 148 MS
EKG Q-T INTERVAL: 416 MS
EKG QRS DURATION: 98 MS
EKG QTC CALCULATION (BAZETT): 401 MS
EKG R AXIS: 76 DEGREES
EKG T AXIS: 51 DEGREES
EKG VENTRICULAR RATE: 56 BPM
PROSTATE SPECIFIC ANTIGEN: 0.38 NG/ML

## 2024-07-26 ENCOUNTER — HOSPITAL ENCOUNTER (OUTPATIENT)
Dept: NUCLEAR MEDICINE | Age: 49
Discharge: HOME | End: 2024-07-26
Payer: COMMERCIAL

## 2024-07-26 ENCOUNTER — HOSPITAL ENCOUNTER (OUTPATIENT)
Age: 49
End: 2024-07-26
Payer: COMMERCIAL

## 2024-07-26 DIAGNOSIS — R07.9 CHEST PAIN, UNSPECIFIED TYPE: ICD-10-CM

## 2024-07-26 LAB
NUC STRESS EJECTION FRACTION: 62 %
STRESS BASELINE DIAS BP: 77 MMHG
STRESS BASELINE HR: 60 BPM
STRESS BASELINE ST DEPRESSION: 0 MM
STRESS BASELINE SYS BP: 117 MMHG
STRESS ST DEPRESSION: 0 MM
STRESS STAGE 1 BP: 118 MMHG
STRESS STAGE 1 DURATION: 1 MIN:SEC
STRESS STAGE 1 HR: 63 BPM
STRESS STAGE 2 BP: NORMAL MMHG
STRESS STAGE 2 DURATION: 1 MIN:SEC
STRESS STAGE 2 HR: 106 BPM
STRESS STAGE 3 BP: NORMAL MMHG
STRESS STAGE 3 DURATION: 1 MIN:SEC
STRESS STAGE 3 HR: 108 BPM
STRESS STAGE RECOVERY 1 BP: NORMAL MMHG
STRESS STAGE RECOVERY 1 DURATION: 1 MIN:SEC
STRESS STAGE RECOVERY 1 HR: 105 BPM
STRESS STAGE RECOVERY 2 BP: NORMAL MMHG
STRESS STAGE RECOVERY 2 DURATION: 1 MIN:SEC
STRESS STAGE RECOVERY 2 HR: 103 BPM
STRESS STAGE RECOVERY 3 BP: NORMAL MMHG
STRESS STAGE RECOVERY 3 DURATION: 1 MIN:SEC
STRESS STAGE RECOVERY 3 HR: 104 BPM
STRESS STAGE RECOVERY 4 BP: NORMAL MMHG
STRESS STAGE RECOVERY 4 DURATION: 1 MIN:SEC
STRESS STAGE RECOVERY 4 HR: 102 BPM
STRESS TARGET HR: 173 BPM
TID: 1.11

## 2024-07-26 PROCEDURE — 3430000000 HC RX DIAGNOSTIC RADIOPHARMACEUTICAL: Performed by: NURSE PRACTITIONER

## 2024-07-26 PROCEDURE — 78452 HT MUSCLE IMAGE SPECT MULT: CPT

## 2024-07-26 PROCEDURE — 93017 CV STRESS TEST TRACING ONLY: CPT

## 2024-07-26 PROCEDURE — A9500 TC99M SESTAMIBI: HCPCS | Performed by: NURSE PRACTITIONER

## 2024-07-26 PROCEDURE — 6360000002 HC RX W HCPCS: Performed by: INTERNAL MEDICINE

## 2024-07-26 RX ORDER — TETRAKIS(2-METHOXYISOBUTYLISOCYANIDE)COPPER(I) TETRAFLUOROBORATE 1 MG/ML
34 INJECTION, POWDER, LYOPHILIZED, FOR SOLUTION INTRAVENOUS
Status: COMPLETED | OUTPATIENT
Start: 2024-07-26 | End: 2024-07-26

## 2024-07-26 RX ORDER — REGADENOSON 0.08 MG/ML
0.4 INJECTION, SOLUTION INTRAVENOUS
Status: COMPLETED | OUTPATIENT
Start: 2024-07-26 | End: 2024-07-26

## 2024-07-26 RX ORDER — TETRAKIS(2-METHOXYISOBUTYLISOCYANIDE)COPPER(I) TETRAFLUOROBORATE 1 MG/ML
10.1 INJECTION, POWDER, LYOPHILIZED, FOR SOLUTION INTRAVENOUS
Status: COMPLETED | OUTPATIENT
Start: 2024-07-26 | End: 2024-07-26

## 2024-07-26 RX ADMIN — Medication 34 MILLICURIE: at 09:48

## 2024-07-26 RX ADMIN — REGADENOSON 0.4 MG: 0.08 INJECTION, SOLUTION INTRAVENOUS at 09:45

## 2024-07-26 RX ADMIN — Medication 10.1 MILLICURIE: at 08:50

## 2024-11-14 ENCOUNTER — TELEPHONE (OUTPATIENT)
Dept: UROLOGY | Age: 49
End: 2024-11-14

## 2024-11-14 NOTE — TELEPHONE ENCOUNTER
Pt scheduled on 11/27 and needs a US with PVR, no orders in system. Please advise if he should have this done prior to his appt or after?

## 2024-11-14 NOTE — TELEPHONE ENCOUNTER
Dr Fuentes last office note states Psa and pvr in one year. Patient aware and voiced understanding.

## 2024-11-27 ENCOUNTER — TELEPHONE (OUTPATIENT)
Dept: UROLOGY | Age: 49
End: 2024-11-27

## 2024-11-27 ENCOUNTER — OFFICE VISIT (OUTPATIENT)
Dept: UROLOGY | Age: 49
End: 2024-11-27

## 2024-11-27 VITALS
WEIGHT: 172.5 LBS | SYSTOLIC BLOOD PRESSURE: 122 MMHG | BODY MASS INDEX: 27.72 KG/M2 | HEIGHT: 66 IN | DIASTOLIC BLOOD PRESSURE: 82 MMHG

## 2024-11-27 DIAGNOSIS — N52.9 ERECTILE DYSFUNCTION, UNSPECIFIED ERECTILE DYSFUNCTION TYPE: ICD-10-CM

## 2024-11-27 DIAGNOSIS — N40.1 BENIGN LOCALIZED PROSTATIC HYPERPLASIA WITH LOWER URINARY TRACT SYMPTOMS (LUTS): Primary | ICD-10-CM

## 2024-11-27 DIAGNOSIS — F52.4 PREMATURE EJACULATION: ICD-10-CM

## 2024-11-27 LAB
BILIRUBIN, URINE: NEGATIVE
BLOOD URINE, POC: NEGATIVE
CHARACTER, URINE: CLEAR
COLOR, UA: YELLOW
GLUCOSE URINE: NEGATIVE MG/DL
KETONES, URINE: NEGATIVE
LEUKOCYTE CLUMPS, URINE: NEGATIVE
NITRITE, URINE: NEGATIVE
PH, URINE: 6 (ref 5–9)
POST VOID RESIDUAL (PVR): 28 ML
PROTEIN, URINE: 100 MG/DL
SPECIFIC GRAVITY UA: >= 1.03 (ref 1–1.03)
UROBILINOGEN, URINE: 0.2 EU/DL (ref 0–1)

## 2024-11-27 RX ORDER — TADALAFIL 2.5 MG/1
2.5 TABLET ORAL PRN
Qty: 30 TABLET | Refills: 1 | Status: SHIPPED | OUTPATIENT
Start: 2024-11-27

## 2024-11-27 NOTE — PATIENT INSTRUCTIONS
Tadalafil (Cialis) 2.5 mg 30 minutes prior to intercourse.    Look into Finasteride 5 mg once daily to prevent additional prostate growth.

## 2024-11-27 NOTE — PROGRESS NOTES
Writer spoke to patient's nursing staff regarding medication issue and was informed that nursing will call the pharmacy to discuss.  Patient was informed that a call to pharmacy will be done.  Please call patient once the medication issue is resolved to let patient know if she can get the med refilled. [phone]   I and D right axillary abscess in office Dr Nathan GRAVES N/A 8/16/2023    CYSTOSCOPY, GREENLIGHT PHOTO VAPORIZATION OF PROSTATE performed by Melvin Fuentes MD at [facility] OR    UPPER GASTROINTESTINAL ENDOSCOPY  2017    Dr. Tracey-- Hiatal hernia    UPPER GASTROINTESTINAL ENDOSCOPY  2023     Family History   Problem Relation Age of Onset    Heart Disease Father     Heart Surgery Father     Diabetes Father     High Blood Pressure Father      Outpatient Medications Marked as Taking for the 11/27/24 encounter (Office Visit) with Srinivasa Manning PA-C   Medication Sig Dispense Refill    losartan (COZAAR) 50 MG tablet TAKE 1 TABLET BY MOUTH DAILY 90 tablet 3    esomeprazole (NEXIUM) 40 MG delayed release capsule Take 1 capsule by mouth daily 90 capsule 3       Patient has no known allergies.  Social History     Tobacco Use   Smoking Status Never   Smokeless Tobacco Never       Social History     Substance and Sexual Activity   Alcohol Use No       REVIEW OF SYSTEMS:  Constitutional: negative  Eyes: negative  Respiratory: negative  Cardiovascular: negative  Gastrointestinal: negative  Musculoskeletal: negative  Genitourinary: negative except for what is in HPI  Skin: negative   Neurological: negative  Hematological/Lymphatic: negative  Psychological: negative    Physical Exam:      Vitals:    11/27/24 0918   BP: 122/82     Patient is a 49 y.o. male in no acute distress and alert and oriented to person, place and time.    Pulmonary: Non-labored respiration.  Cardiovascular: Normal rate, regular rhythm, normal peripheral pulses.  Skin: Warm and dry.  Psych: Normal mood and affect.  Genitourinary: Bladder non-distended and non-tender.      Assessment and Plan   BPH w/ LUTS  - PVP in August of 2023 by Dr. Fuentes. Has done well since then. Stream is strong and frequency is down.  - PSA WNL at 0.38. Continue annual surveillance.    Erectile Dysfunction  Premature Ejaculation  - Pre-mature ejaculation. New onset. Bothersome. Also with

## 2025-06-21 LAB — PSA, ULTRASENSITIVE: 0.32 NG/ML (ref 0–4)

## 2025-07-07 ENCOUNTER — HOSPITAL ENCOUNTER (EMERGENCY)
Age: 50
Discharge: HOME | End: 2025-07-07
Payer: COMMERCIAL

## 2025-07-07 ENCOUNTER — APPOINTMENT (OUTPATIENT)
Dept: GENERAL RADIOLOGY | Age: 50
End: 2025-07-07
Payer: COMMERCIAL

## 2025-07-07 VITALS
DIASTOLIC BLOOD PRESSURE: 99 MMHG | TEMPERATURE: 97.4 F | OXYGEN SATURATION: 100 % | SYSTOLIC BLOOD PRESSURE: 136 MMHG | HEART RATE: 69 BPM | RESPIRATION RATE: 20 BRPM

## 2025-07-07 DIAGNOSIS — S63.501A SPRAIN OF RIGHT WRIST, INITIAL ENCOUNTER: Primary | ICD-10-CM

## 2025-07-07 PROCEDURE — 73110 X-RAY EXAM OF WRIST: CPT

## 2025-07-07 PROCEDURE — 99213 OFFICE O/P EST LOW 20 MIN: CPT | Performed by: NURSE PRACTITIONER

## 2025-07-07 PROCEDURE — 99213 OFFICE O/P EST LOW 20 MIN: CPT

## 2025-07-07 PROCEDURE — 73130 X-RAY EXAM OF HAND: CPT

## 2025-07-07 ASSESSMENT — ENCOUNTER SYMPTOMS
COUGH: 0
NAUSEA: 0
DIARRHEA: 0
RHINORRHEA: 0
CHEST TIGHTNESS: 0
SHORTNESS OF BREATH: 0
VOMITING: 0
SORE THROAT: 0

## 2025-07-07 ASSESSMENT — PAIN SCALES - GENERAL: PAINLEVEL_OUTOF10: 2

## 2025-07-07 ASSESSMENT — PAIN DESCRIPTION - LOCATION: LOCATION: HAND
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
## 2025-08-11 ENCOUNTER — HOSPITAL ENCOUNTER (OUTPATIENT)
Dept: CT IMAGING | Age: 50
Discharge: HOME | End: 2025-08-11
Attending: FAMILY MEDICINE
Payer: COMMERCIAL

## 2025-08-11 DIAGNOSIS — M54.2 NECK PAIN: ICD-10-CM

## 2025-08-11 PROCEDURE — 70490 CT SOFT TISSUE NECK W/O DYE: CPT

## (undated) DEVICE — SOLUTION IRRIG 1000ML STRL H2O USP PLAS POUR BTL

## (undated) DEVICE — CYSTO: Brand: MEDLINE INDUSTRIES, INC.

## (undated) DEVICE — SOLUTION IRRIG 3000ML 0.9% SOD CHL USP UROMATIC PLAS CONT

## (undated) DEVICE — LASER SURG W22XH58IN D36IN 475LB SLD STATE FREQ DOUBLED

## (undated) DEVICE — DRAINBAG,ANTI-REFLUX TOWER,L/F,2000ML,LL: Brand: MEDLINE

## (undated) DEVICE — CATHETER,FOLEY,3WAY,SILI-ELAST,20FR,30ML: Brand: MEDLINE